# Patient Record
Sex: FEMALE | Race: WHITE | NOT HISPANIC OR LATINO | Employment: FULL TIME | URBAN - NONMETROPOLITAN AREA
[De-identification: names, ages, dates, MRNs, and addresses within clinical notes are randomized per-mention and may not be internally consistent; named-entity substitution may affect disease eponyms.]

---

## 2021-08-28 ENCOUNTER — HOSPITAL ENCOUNTER (EMERGENCY)
Facility: HOSPITAL | Age: 26
Discharge: HOME | End: 2021-08-28
Attending: EMERGENCY MEDICINE | Admitting: EMERGENCY MEDICINE

## 2021-08-28 ENCOUNTER — APPOINTMENT (OUTPATIENT)
Dept: ULTRASOUND IMAGING | Facility: HOSPITAL | Age: 26
End: 2021-08-28

## 2021-08-28 VITALS
HEART RATE: 88 BPM | WEIGHT: 118 LBS | BODY MASS INDEX: 20.14 KG/M2 | SYSTOLIC BLOOD PRESSURE: 104 MMHG | RESPIRATION RATE: 18 BRPM | OXYGEN SATURATION: 99 % | HEIGHT: 64 IN | DIASTOLIC BLOOD PRESSURE: 72 MMHG | TEMPERATURE: 98.1 F

## 2021-08-28 DIAGNOSIS — Z97.5 IUD (INTRAUTERINE DEVICE) IN PLACE: Primary | ICD-10-CM

## 2021-08-28 PROCEDURE — 99282 EMERGENCY DEPT VISIT SF MDM: CPT

## 2021-08-28 PROCEDURE — 76830 TRANSVAGINAL US NON-OB: CPT

## 2021-09-06 ENCOUNTER — TRANSCRIBE ORDERS (OUTPATIENT)
Dept: LAB | Facility: HOSPITAL | Age: 26
End: 2021-09-06

## 2021-09-06 ENCOUNTER — LAB (OUTPATIENT)
Dept: LAB | Facility: HOSPITAL | Age: 26
End: 2021-09-06

## 2021-09-06 DIAGNOSIS — Z20.822 COVID-19 RULED OUT: ICD-10-CM

## 2021-09-06 DIAGNOSIS — Z20.822 COVID-19 RULED OUT: Primary | ICD-10-CM

## 2021-09-06 PROCEDURE — U0004 COV-19 TEST NON-CDC HGH THRU: HCPCS

## 2021-09-06 PROCEDURE — C9803 HOPD COVID-19 SPEC COLLECT: HCPCS

## 2021-09-07 LAB — SARS-COV-2 RNA NOSE QL NAA+PROBE: DETECTED

## 2021-09-08 ENCOUNTER — TELEPHONE (OUTPATIENT)
Dept: OTHER | Facility: HOSPITAL | Age: 26
End: 2021-09-08

## 2021-09-08 NOTE — TELEPHONE ENCOUNTER
Patient informed of positive COVID results.  Acknowledged receipt of [facility] instruction form. All questions answered.

## 2022-06-22 PROCEDURE — U0004 COV-19 TEST NON-CDC HGH THRU: HCPCS | Performed by: NURSE PRACTITIONER

## 2022-07-27 PROCEDURE — U0004 COV-19 TEST NON-CDC HGH THRU: HCPCS | Performed by: FAMILY MEDICINE

## 2022-09-26 ENCOUNTER — TRANSCRIBE ORDERS (OUTPATIENT)
Dept: ADMINISTRATIVE | Facility: HOSPITAL | Age: 27
End: 2022-09-26

## 2022-09-26 DIAGNOSIS — Z02.1 NEED FOR HISTORY AND PHYSICAL EXAMINATION FOR EMPLOYMENT: Primary | ICD-10-CM

## 2022-09-29 ENCOUNTER — HOSPITAL ENCOUNTER (OUTPATIENT)
Dept: GENERAL RADIOLOGY | Facility: HOSPITAL | Age: 27
Discharge: HOME | End: 2022-09-29

## 2022-09-29 DIAGNOSIS — Z02.1 NEED FOR HISTORY AND PHYSICAL EXAMINATION FOR EMPLOYMENT: ICD-10-CM

## 2022-09-29 PROCEDURE — 71046 X-RAY EXAM CHEST 2 VIEWS: CPT

## 2023-01-09 NOTE — PROGRESS NOTES
"Chief Complaint  Depression, anxiety, nicotine use, and marijuana use    Subjective          Aleyda Brown presents to [facility] by herself for an initial evaluation. The patient is a self-referral.    History of Present Illness: Aleyda states, \"I am here for my mental health. I have an IUD and have always been on birth control since 17 because my periods are so bad.\" Aleyda tells me she has struggled with anxiety and depression for some time. She states, \"I lost my mind a couple of weeks ago.\" She reports having a period of severe depression when she was sleeping all day and feeling extreme fatigue and feelings of worthlessness. She feels her brain is in a fog and she cannot concentrate during depression. She reports her mood turning to anger after the depressed states. She tells me there are no aggravating factors or reasons for the anger which leads to further guilt feelings. She tells me her anger is directed towards herself and she has thoughts such as \"I don't want to do life.\" She sleeps 11 hours when she is down and only about 6 hours when she is not. She uses cannabis to help her sleep, anxiety, and mood. She smokes the \"dab form and blunts.\" She tells me she does not smoke everyday because she has a physically active job and likes to go to the gym about 3 times a week. She reports a varying appetite and often has to force herself to eat when she is depressed. She goes for days without eating when she is depressed. She tells me her anxiety is constant and she has situational anxiety which causes her heart to pound and her to feel shaky and sweaty.  She is not taking psychiatric medications at this time. She denies any HI/AVH.    Past Psychiatric History: Aleyda had therapy as a child after her parents' divorce. She denies any medications. She denies any inpatient psychiatric hospitalizations or residential treatments. She denies any suicide attempts. She " "denies any self-harming behaviors.    Substance Use/Abuse: Aleyda began using THC at 18. She tells me her use was younger in her teens and early adulthood before she began working. She would smoke about 2 GMs daily. She finds her use decreasing over time but will smoke more when she is depressed. She feels she does rely on it to manage her mood and anxiety and it has caused problems with her relationship with her mother. She denies needing cannabis to find enjoyment in activities and denies risky behaviors. She smokes about 1 GM every 2 days now. She has cravings when she is anxious or depressed but denies any now. She denies any legal problems related to cannabis use. She started using alcohol at age 17. She had increased use into her early 20s and when she was out with friends. She felt it worsened her depression so her use is rare now. She last drank beer about 1.5 weeks ago. She denies any cravings or legal problems related to the use. She reports starting nicotine use ar 17. She started smoking cigarettes and then turned to e-cigarettes when she wanted to stop. She is smoking cigarettes again and is a 1/2 ppd smoker. She rates her cravings a 8 out of 10 with 10 being the highest. She would like to quit. She has tried psychedelic mushrooms and Adderall once. She denies any complications of the use or legal problems or cravings.      Family Psychiatric History: Aleyda tells me her biological mother has depression. She reports her biological father as having addiction issues.     Developmental History: Aleyda was raised by her biological parents until about age 10. They  and  when she was 11. She tells me her father was using drugs and her relationship with him was \"off and on.\" She was an older biological brother who is 29. Her mother remarried when she was 24 but they had been together for 13 years before this. She tells me the relationship with her mother, step-dad, and brother is \"great.\" " "She tells me she was a \"heathen\" is school and had one year of alternative school her freshman year. 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 She was able to make and keep friends. She tells me she identifies as carlos and had one long-term relationship with a female from [age]-years-old. They lived together before splitting up. She has dated since then but tells me she has a difficult time getting close to others for fear of abandonment. She is working at [employer].     Social History: Aleyda currently lives in [place] by herself. She has two dachshunds, Farhana and Mc. She is working full-time at [employer] and is interviewing for a promotion in March. She enjoys taking her dogs to the park, going to the gym, cleaning, and visiting her grandparents. She reports the daily use of nicotine, the weekly use of cannabis, and the rare use of alcohol.     Objective   Vital Signs:   /64   Pulse 73   Ht 162.6 cm (64\")   Wt 54 kg (119 lb)   BMI 20.43 kg/m²       PHQ-9 Score:   PHQ-9 Total Score: 17     Mental Status Exam:   Hygiene:   good  Cooperation:  Cooperative  Eye Contact:  Good  Psychomotor Behavior:  Appropriate  Affect:  tearful at times  Mood: depressed  Speech:  Normal  Thought Process:  Goal directed and Linear  Thought Content:  Normal  Suicidal:  None  Homicidal:  None  Hallucinations:  None  Delusion:  None  Memory:  Intact  Orientation:  Person, Place, Time and Situation  Reliability:  good  Insight:  Good  Judgement:  Good  Impulse Control:  Good  Physical/Medical Issues:  No      Current Medications:   Current Outpatient Medications   Medication Sig Dispense Refill   • levonorgestrel (Mirena, 52 MG,) 20 MCG/24HR IUD 1 each by Intrauterine route 1 (One) Time.     • multivitamin with minerals tablet tablet Take 1 tablet by mouth Daily.     • FLUoxetine (PROzac) 10 MG capsule Take 1 capsule by mouth Daily for 14 days, THEN 2 " capsules Daily for 14 days. 42 capsule 0   • propranolol (INDERAL) 10 MG tablet Take 1 tablet by mouth 2 (Two) Times a Day As Needed (anxiety). 60 tablet 2     No current facility-administered medications for this visit.   Physical Exam  Vitals and nursing note reviewed.   Constitutional:       Appearance: Normal appearance. She is well-developed.   Musculoskeletal:         General: Normal range of motion.   Skin:     General: Skin is warm and dry.   Neurological:      Mental Status: She is alert and oriented to person, place, and time.   Psychiatric:         Attention and Perception: Attention normal.         Mood and Affect: Mood is depressed. Affect is tearful.         Speech: Speech normal.         Behavior: Behavior normal. Behavior is cooperative.         Thought Content: Thought content includes suicidal (passive without intent or plan) ideation.         Cognition and Memory: Cognition normal.         Judgment: Judgment normal.        Result Review :                 Assessment and Plan    Diagnoses and all orders for this visit:    1. Moderate major depression (HCC) (Primary)  -     FLUoxetine (PROzac) 10 MG capsule; Take 1 capsule by mouth Daily for 14 days, THEN 2 capsules Daily for 14 days.  Dispense: 42 capsule; Refill: 0    2. ROWENA (generalized anxiety disorder)  -     FLUoxetine (PROzac) 10 MG capsule; Take 1 capsule by mouth Daily for 14 days, THEN 2 capsules Daily for 14 days.  Dispense: 42 capsule; Refill: 0  -     propranolol (INDERAL) 10 MG tablet; Take 1 tablet by mouth 2 (Two) Times a Day As Needed (anxiety).  Dispense: 60 tablet; Refill: 2    3. Nicotine dependence with current use    4. Marijuana use, continuous         Impression:  -This is an initial evaluation of the patient. Aleyda is a 27-year-old female who presents by herself for a medication evaluation. She has been experiencing depression and anxiety for some time. She has been managing her mood and anxiety with cannabis use but would  like to try and stop. We discussed the risks of use, especially in the dab form, due to high THC concentrations which can lead to psychosis. She feels she can stop dabbing and may need time to stop the plant form. We discussed implementing small changes to decrease use over time. She expresses her desire to stop smoking so we discussed managing depression and nicotine carvings with Wellbutrin SR. She has concerns since this can raise dopamine and possibly worsen psychosis when combined with THC. So, we discussed managing her symptoms with Prozac and Propranolol. I explained the mechanism of action and purpose of both medications. We discussed risks and benefits, as well as adverse effects. She feels this may be a positive choice. Lastly, we discussed implementing therapy. I provided information about therapy within this clinic and gave her the resource [facility].  -Aleyda Brown  reports that she has been smoking cigarettes. She has been smoking an average of .5 packs per day. She does not have any smokeless tobacco history on file.. I have educated her on the risk of diseases from using tobacco products such as cancer, COPD and heart disease.     I advised her to quit and she is willing to quit. We have discussed the following method/s for tobacco cessation:  Counseling Prescription Medicaiton.  Together we have set a quit date for unknown.  She will follow up with me in 4 weeks or sooner to check on her progress.    I spent 3  minutes counseling the patient.       -Initiate Prozac 10 mg daily for two weeks and then 20 mg for depression and anxiety.  -Initiate Propranolol 10 mg two times daily as needed for anxiety.  -Consider therapy.    -The ANTOIN[facility]TE report, request number [id number] , of the past 12 months were reviewed and is appropriate.  The patient/guardian reports taking the medication only as prescribed.  The patient/guardian denies any abuse or misuse of the medication.  The patient/guardian  denies any other substance use or issues.  There are no apparent substance related issues.  The patient reports no side effects of the current medication usage.  The patient/guardian has reported significant improvement with medication usage and wishes to continue medication as prescribed.  The patient/guardian is appropriate to continue with current medication usage at this time.  Reinforced risks and side effects of medication usage, patient and/or guardian verbalize understanding in their own words and are in agreement with current plan.    TREATMENT PLAN/GOALS: Continue supportive psychotherapy efforts and medications as indicated. Treatment and medication options discussed during today's visit. Patient ackowledged and verbally consented to continue with current treatment plan and was educated on the importance of compliance with treatment and follow-up appointments.    MEDICATION ISSUES:    We discussed risks, benefits, and side effects of the above medications and the patient was agreeable with the plan. Patient was educated on the importance of compliance with treatment and follow-up appointments.  Patient is agreeable to call the office with any worsening of symptoms or onset of side effects. Patient is agreeable to call 911 or go to the nearest ER should he/she begin having SI/HI.      Counseled patient regarding multimodal approach with healthy nutrition, healthy sleep, regular physical activity, social activities, counseling, and medications.      Coping skills reviewed and encouraged positive framing of thoughts     Assisted patient in processing above session content; acknowledged and normalized patient's thoughts, feelings, and concerns.  Applied  positive coping skills and behavior management in session.  Allowed patient to freely discuss issues without interruption or judgment. Provided safe, confidential environment to facilitate the development of positive therapeutic relationship and encourage  open, honest communication. Assisted patient in identifying risk factors which would indicate the need for higher level of care including thoughts to harm self or others and/or self-harming behavior and encouraged patient to contact this office, call 911, or present to the nearest emergency room should any of these events occur. Discussed crisis intervention services and means to access.     MEDS ORDERED DURING VISIT:  New Medications Ordered This Visit   Medications   • FLUoxetine (PROzac) 10 MG capsule     Sig: Take 1 capsule by mouth Daily for 14 days, THEN 2 capsules Daily for 14 days.     Dispense:  42 capsule     Refill:  0   • propranolol (INDERAL) 10 MG tablet     Sig: Take 1 tablet by mouth 2 (Two) Times a Day As Needed (anxiety).     Dispense:  60 tablet     Refill:  2           Follow Up   Return in about 4 weeks (around 2/8/2023) for Medication Check.    Patient was given instructions and counseling regarding her condition or for health maintenance advice. Please see specific information pulled into the AVS if appropriate.     This document has been electronically signed by SONIDO Wooten  January 12, 2023 09:38 EST      This document has been electronically signed by SONIDO Wayne, PMHNP-BC  January 12, 2023 09:38 EST    Part of this note may be an electronic transcription/translation of spoken language to printed text using the Dragon Dictation System.

## 2023-01-11 ENCOUNTER — OFFICE VISIT (OUTPATIENT)
Dept: PSYCHIATRY | Facility: CLINIC | Age: 28
End: 2023-01-11
Payer: COMMERCIAL

## 2023-01-11 VITALS
SYSTOLIC BLOOD PRESSURE: 102 MMHG | WEIGHT: 119 LBS | HEIGHT: 64 IN | BODY MASS INDEX: 20.32 KG/M2 | DIASTOLIC BLOOD PRESSURE: 64 MMHG | HEART RATE: 73 BPM

## 2023-01-11 DIAGNOSIS — F41.1 GAD (GENERALIZED ANXIETY DISORDER): ICD-10-CM

## 2023-01-11 DIAGNOSIS — F17.200 NICOTINE DEPENDENCE WITH CURRENT USE: ICD-10-CM

## 2023-01-11 DIAGNOSIS — F32.1 MODERATE MAJOR DEPRESSION: Primary | ICD-10-CM

## 2023-01-11 DIAGNOSIS — F12.90 MARIJUANA USE, CONTINUOUS: ICD-10-CM

## 2023-01-11 PROCEDURE — 90792 PSYCH DIAG EVAL W/MED SRVCS: CPT | Performed by: NURSE PRACTITIONER

## 2023-01-11 RX ORDER — MULTIPLE VITAMINS W/ MINERALS TAB 9MG-400MCG
1 TAB ORAL DAILY
COMMUNITY

## 2023-01-11 RX ORDER — FLUOXETINE 10 MG/1
CAPSULE ORAL
Qty: 42 CAPSULE | Refills: 0 | Status: SHIPPED | OUTPATIENT
Start: 2023-01-11 | End: 2023-02-09 | Stop reason: SDUPTHER

## 2023-01-11 RX ORDER — PROPRANOLOL HYDROCHLORIDE 10 MG/1
10 TABLET ORAL 2 TIMES DAILY PRN
Qty: 60 TABLET | Refills: 2 | Status: SHIPPED | OUTPATIENT
Start: 2023-01-11

## 2023-02-09 DIAGNOSIS — F32.1 MODERATE MAJOR DEPRESSION: ICD-10-CM

## 2023-02-09 DIAGNOSIS — F41.1 GAD (GENERALIZED ANXIETY DISORDER): ICD-10-CM

## 2023-02-09 RX ORDER — FLUOXETINE HYDROCHLORIDE 20 MG/1
20 CAPSULE ORAL DAILY
Qty: 30 CAPSULE | Refills: 2 | Status: SHIPPED | OUTPATIENT
Start: 2023-02-09

## 2023-02-09 NOTE — TELEPHONE ENCOUNTER
Rx Refill Note  Requested Prescriptions     Pending Prescriptions Disp Refills   • FLUoxetine (PROzac) 10 MG capsule 42 capsule 0     Sig: Take 1 capsule by mouth Daily for 14 days, THEN 2 capsules Daily for 14 days.      Last office visit with prescribing clinician: 1/11/2023      Next office visit with prescribing clinician: 2/14/2023            Liane Castellanos MA  02/09/23, 10:46 EST

## 2023-02-10 DIAGNOSIS — F32.1 MODERATE MAJOR DEPRESSION: ICD-10-CM

## 2023-02-10 DIAGNOSIS — F41.1 GAD (GENERALIZED ANXIETY DISORDER): ICD-10-CM

## 2023-02-10 RX ORDER — FLUOXETINE HYDROCHLORIDE 20 MG/1
20 CAPSULE ORAL DAILY
Qty: 30 CAPSULE | Refills: 2 | OUTPATIENT
Start: 2023-02-10

## 2023-02-10 NOTE — PROGRESS NOTES
"This provider is located at [facility] ,[address], [address] in [place], [place],using a secure VocalZoomhart Video Visit through [employer]. Patient is being seen remotely via telehealth at their home address in [place], and stated they are in a secure environment for this session. The patient's condition being diagnosed/treated is appropriate for telemedicine. The provider identified herself as well as her credentials.   The patient, and/or patients guardian, consent to be seen remotely, and when consent is given they understand that the consent allows for patient identifiable information to be sent to a third party as needed.   They may refuse to be seen remotely at any time. The electronic data is encrypted and password protected, and the patient and/or guardian has been advised of the potential risks to privacy not withstanding such measures.    Chief Complaint  Depression, anxiety, nicotine use, and marijuana use    Subjective          Aleyda Brown presents today via MyChart Video through [employer] by herself for a follow up and medication check.    History of Present Illness: Aleyda states, \"I am having more good days than bad days.\" Aleyda tells me the addition of Prozac has been helpful for both depressive symptoms and anxiety. She used Propranolol about three times and would like to continue PRN. She is sleeping and eating well. She continues to work and denies any concerns.  She is taking Prozac and Propranolol. She denies any side effects. She denies any SI/HI/AVH.    Current Medications:   Current Outpatient Medications   Medication Sig Dispense Refill   • FLUoxetine (PROzac) 20 MG capsule Take 1 capsule by mouth Daily. 30 capsule 2   • levonorgestrel (Mirena, 52 MG,) 20 MCG/24HR IUD 1 each by Intrauterine route 1 (One) Time.     • multivitamin with minerals tablet tablet Take 1 tablet by mouth Daily.     • propranolol (INDERAL) 10 MG tablet Take 1 tablet by " mouth 2 (Two) Times a Day As Needed (anxiety). 60 tablet 2     No current facility-administered medications for this visit.       Objective   Vital Signs:   There were no vitals taken for this visit.    Physical Exam  Nursing note reviewed. Vitals reviewed: Vitals not obtained due to nature of telehealth visit.   Constitutional:       Appearance: Normal appearance.   Neurological:      General: No focal deficit present.      Mental Status: She is alert and oriented to person, place, and time.   Psychiatric:         Attention and Perception: Attention normal.         Mood and Affect: Mood normal.         Speech: Speech normal.         Behavior: Behavior normal. Behavior is cooperative.         Thought Content: Thought content normal.         Cognition and Memory: Cognition normal.         Judgment: Judgment normal.        Result Review :                   Assessment and Plan    Problem List Items Addressed This Visit    None  Visit Diagnoses     Moderate major depression (HCC)    -  Primary    ROWENA (generalized anxiety disorder)        Nicotine dependence with current use  (Chronic)       Marijuana use, continuous  (Chronic)             Mental Status Exam:   Hygiene:   good  Cooperation:  Cooperative  Eye Contact:  Good  Psychomotor Behavior:  Appropriate  Affect:  Appropriate  Mood: normal  Speech:  Normal  Thought Process:  Goal directed and Linear  Thought Content:  Normal  Suicidal:  None  Homicidal:  None  Hallucinations:  None  Delusion:  None  Memory:  Intact  Orientation:  Person, Place, Time and Situation  Reliability:  good  Insight:  Good  Judgement:  Good  Impulse Control:  Good  Physical/Medical Issues:  No      PHQ-9 Score:   PHQ-9 Total Score:       ROWENA-7 Score:       Impression/Plan:  -This is a follow up and medication check. Aleyda reports improved mood and anxiety. She feels Prozac is helping her have more good days overall. She'd like to stay at the 20 mg dose for an additional month and continue  using Propranolol only as needed. She continues to work. She is considering therapy and would like to explore options in our clinic. She is sleeping and eating well. She will call at the susanna of her current prescription if she'd like to consider an increase in dose.   -Continue Prozac 20 mg for depression and anxiety. Patient has refills.   -Continue Propranolol 10 mg two times daily as needed for anxiety. Patient has refills.   -Consider therapy.    MEDS ORDERED DURING VISIT:  No orders of the defined types were placed in this encounter.      I spent 20 minutes caring for Aleyda on this date of service. This time includes time spent by me in the following activities:preparing for the visit, performing a medically appropriate examination and/or evaluation , counseling and educating the patient/family/caregiver, ordering medications, tests, or procedures, documenting information in the medical record and care coordination  Follow Up   Return in about 2 months (around 4/14/2023) for Medication Check.  Patient was given instructions and counseling regarding her condition or for health maintenance advice. Please see specific information pulled into the AVS if appropriate.       TREATMENT PLAN/GOALS: Continue supportive psychotherapy efforts and medications as indicated. Treatment and medication options discussed during today's visit. Patient acknowledged and verbally consented to continue with current treatment plan and was educated on the importance of compliance with treatment and follow-up appointments.    MEDICATION ISSUES:  Discussed medication options and treatment plan of prescribed medication as well as the risks, benefits, and side effects including potential falls, possible impaired driving and metabolic adversities among others. Patient is agreeable to call the office with any worsening of symptoms or onset of side effects. Patient is agreeable to call 911, call [phone], or go to the nearest ER should he/she  begin having SI/HI.      This document has been electronically signed by SONIDO Wayne, PMHNP-BC  February 14, 2023 10:03 EST    Part of this note may be an electronic transcription/translation of spoken language to printed text using the Dragon Dictation System.

## 2023-02-11 DIAGNOSIS — F32.1 MODERATE MAJOR DEPRESSION: ICD-10-CM

## 2023-02-11 DIAGNOSIS — F41.1 GAD (GENERALIZED ANXIETY DISORDER): ICD-10-CM

## 2023-02-13 RX ORDER — FLUOXETINE HYDROCHLORIDE 20 MG/1
20 CAPSULE ORAL DAILY
Qty: 30 CAPSULE | Refills: 2 | OUTPATIENT
Start: 2023-02-13

## 2023-02-13 RX ORDER — FLUOXETINE 10 MG/1
CAPSULE ORAL
Qty: 42 CAPSULE | Refills: 0 | OUTPATIENT
Start: 2023-02-13

## 2023-02-14 ENCOUNTER — TELEMEDICINE (OUTPATIENT)
Dept: PSYCHIATRY | Facility: CLINIC | Age: 28
End: 2023-02-14
Payer: COMMERCIAL

## 2023-02-14 DIAGNOSIS — F17.200 NICOTINE DEPENDENCE WITH CURRENT USE: Chronic | ICD-10-CM

## 2023-02-14 DIAGNOSIS — F41.1 GAD (GENERALIZED ANXIETY DISORDER): ICD-10-CM

## 2023-02-14 DIAGNOSIS — F32.1 MODERATE MAJOR DEPRESSION: Primary | ICD-10-CM

## 2023-02-14 DIAGNOSIS — F12.90 MARIJUANA USE, CONTINUOUS: Chronic | ICD-10-CM

## 2023-02-14 PROCEDURE — 99213 OFFICE O/P EST LOW 20 MIN: CPT | Performed by: NURSE PRACTITIONER

## 2023-04-15 NOTE — PROGRESS NOTES
"This provider is located at [facility] ,[address], [address] in [place], [place],using a secure MyChart Video Visit through [employer]. Patient is being seen remotely via telehealth at their home address in [place], and stated they are in a secure environment for this session. The patient's condition being diagnosed/treated is appropriate for telemedicine. The provider identified herself as well as her credentials.   The patient, and/or patients guardian, consent to be seen remotely, and when consent is given they understand that the consent allows for patient identifiable information to be sent to a third party as needed.   They may refuse to be seen remotely at any time. The electronic data is encrypted and password protected, and the patient and/or guardian has been advised of the potential risks to privacy not withstanding such measures.    Chief Complaint  Depression, anxiety, nicotine use, and marijuana use    Subjective          Aleyda Brown presents today via MyChart Video through [employer] by herself for a follow up and medication check.    History of Present Illness: Aleyda states, \"I am doing pretty good.\"  Aleyda tells me that she has been having a difficult time with depression and anxiety.  She tells me this is affecting her work.  About 2-1/2 weeks ago she was feeling very down but also overly anxious.  She presented to  and discussed her concerns.  They felt FMLA may be an option for her while she adjust her medications.  After a depressive period, she will have increased energy, more motivation to engage in activities, racing thoughts, decreased need for sleep, and elevated mood.  She has been discussing these symptoms with her mother who feels they could be related to her previous ADHD diagnosis.  She was treated with Concerta and another stimulant as a child and notes that they both worsened her mood.  She has also considered that her depression " may be related to her birth control.  She reports compliance with her psychiatric medications.  She is taking Prozac and Propranolol (rarely. She denies any side effects. She denies any SI/HI/AVH.    Current Medications:   Current Outpatient Medications   Medication Sig Dispense Refill   • FLUoxetine (PROzac) 20 MG capsule Take 1 capsule by mouth Daily. 30 capsule 2   • levonorgestrel (Mirena, 52 MG,) 20 MCG/24HR IUD 1 each by Intrauterine route 1 (One) Time.     • multivitamin with minerals tablet tablet Take 1 tablet by mouth Daily.     • lamoTRIgine (LaMICtal) 25 MG tablet Take 1 tablet by mouth Every Night for 14 days, THEN 2 tablets Every Night for 14 days. 42 tablet 0   • propranolol (INDERAL) 10 MG tablet Take 1 tablet by mouth 2 (Two) Times a Day As Needed (anxiety). (Patient not taking: Reported on 4/17/2023) 60 tablet 2     No current facility-administered medications for this visit.       Objective   Vital Signs:   There were no vitals taken for this visit.    Physical Exam  Nursing note reviewed. Vitals reviewed: Vitals not obtained due to nature of telehealth visit.   Constitutional:       Appearance: Normal appearance. She is normal weight.   Neurological:      General: No focal deficit present.      Mental Status: She is alert and oriented to person, place, and time.   Psychiatric:         Attention and Perception: Attention normal.         Mood and Affect: Mood is anxious.         Speech: Speech normal.         Behavior: Behavior normal. Hyperactive: restless. Behavior is cooperative.         Thought Content: Thought content normal.         Cognition and Memory: Cognition normal.         Judgment: Judgment normal.        The following data was reviewed by: SONIDO Wooten on 04/17/2023:  POC Urinalysis Dipstick, Multipro (Automated Dipstick) (03/27/2023 13:22)  Urine Culture - Urine, Urine, Clean Catch (03/27/2023 13:31)    UC with Annabella Wesley APRN (03/27/2023)  Assessment and  Plan    Problem List Items Addressed This Visit    None  Visit Diagnoses     Moderate major depression  (Chronic)   -  Primary    Exploring a possible bipolar disorder    Relevant Medications    lamoTRIgine (LaMICtal) 25 MG tablet    ROWENA (generalized anxiety disorder)  (Chronic)       Relevant Medications    lamoTRIgine (LaMICtal) 25 MG tablet    Nicotine dependence with current use  (Chronic)       Marijuana use, continuous              Mental Status Exam:   Hygiene:   good  Cooperation:  Cooperative  Eye Contact:  Good  Psychomotor Behavior:  Restless  Affect:  Appropriate  Mood: anxious  Speech:  Normal  Thought Process:  Goal directed and Linear  Thought Content:  Normal  Suicidal:  None  Homicidal:  None  Hallucinations:  None  Delusion:  None  Memory:  Intact  Orientation:  Person, Place, Time and Situation  Reliability:  good  Insight:  Good  Judgement:  Good  Impulse Control:  Good  Physical/Medical Issues:  No      PHQ-9 Score:   PHQ-9 Total Score:       ROWENA-7 Score:       Impression/Plan:  -This is a follow up and medication check. Aleyda reports having ongoing mood lability.  For about a week, she was experiencing depression and anxiety.  These symptoms were interfering with her ability to work.  She used some vacation days and also had to take an occurrence.  Her  discussed possibly using FMLA while she adjust her medications.  After the depressive states, she feels she has more motivation, increased energy, racing thoughts, intense interest, and decreased need for sleep.  Her mother attributes these symptoms to ADHD; however, I provided education about a possible bipolar disorder.  She appears to have fluctuations in mood with a time frame that coincides with a hypomanic episode so bipolar 2 disorder.  I encouraged her to complete a mood tracker, as well as a rapid mood screener.  I provided an email for her to return these items.  She and I discussed possibly using lamotrigine for  mood stabilization.  I informed her that increasing Prozac could further activate a possible bipolar disorder.  I explained the mechanism of action and purpose of the medication, as well as risk versus benefits and adversities.  She agrees to try.  He is aware that if she would like to pursue FMLA, I will help complete the paperwork but it may take up to a week.  -Initiate Lamotrigine 25 mg nightly for two weeks and then 50 mg nightly for depression and anxiety. I explained the purpose of this medication to the patient We discussed the risks versus benefits of adding this to the regiment, as well as potential side effects including rash. Verbalizes understanding.   -Continue Prozac 20 mg for depression and anxiety. Patient has refills.   -Continue Propranolol 10 mg two times daily as needed for anxiety. Patient has refills.   -Consider therapy.  -Complete mood tracker and rapid mood screener    MEDS ORDERED DURING VISIT:  New Medications Ordered This Visit   Medications   • lamoTRIgine (LaMICtal) 25 MG tablet     Sig: Take 1 tablet by mouth Every Night for 14 days, THEN 2 tablets Every Night for 14 days.     Dispense:  42 tablet     Refill:  0     Follow Up   Return in about 4 weeks (around 5/15/2023) for Medication Check.  Patient was given instructions and counseling regarding her condition or for health maintenance advice. Please see specific information pulled into the AVS if appropriate.       TREATMENT PLAN/GOALS: Continue supportive psychotherapy efforts and medications as indicated. Treatment and medication options discussed during today's visit. Patient acknowledged and verbally consented to continue with current treatment plan and was educated on the importance of compliance with treatment and follow-up appointments.    MEDICATION ISSUES:  Discussed medication options and treatment plan of prescribed medication as well as the risks, benefits, and side effects including potential falls, possible impaired  driving and metabolic adversities among others. Patient is agreeable to call the office with any worsening of symptoms or onset of side effects. Patient is agreeable to call 911, call [phone], or go to the nearest ER should he/she begin having SI/HI.      This document has been electronically signed by SONIDO Wayne, PMHNP-BC  April 17, 2023 12:10 EDT    Part of this note may be an electronic transcription/translation of spoken language to printed text using the Dragon Dictation System.

## 2023-04-17 ENCOUNTER — TELEMEDICINE (OUTPATIENT)
Dept: PSYCHIATRY | Facility: CLINIC | Age: 28
End: 2023-04-17
Payer: COMMERCIAL

## 2023-04-17 DIAGNOSIS — F41.1 GAD (GENERALIZED ANXIETY DISORDER): Chronic | ICD-10-CM

## 2023-04-17 DIAGNOSIS — F12.90 MARIJUANA USE, CONTINUOUS: ICD-10-CM

## 2023-04-17 DIAGNOSIS — F32.1 MODERATE MAJOR DEPRESSION: Primary | Chronic | ICD-10-CM

## 2023-04-17 DIAGNOSIS — F17.200 NICOTINE DEPENDENCE WITH CURRENT USE: Chronic | ICD-10-CM

## 2023-04-17 PROCEDURE — 99214 OFFICE O/P EST MOD 30 MIN: CPT | Performed by: NURSE PRACTITIONER

## 2023-04-17 RX ORDER — LAMOTRIGINE 25 MG/1
TABLET ORAL
Qty: 42 TABLET | Refills: 0 | Status: SHIPPED | OUTPATIENT
Start: 2023-04-17 | End: 2023-05-15

## 2023-04-26 ENCOUNTER — TELEPHONE (OUTPATIENT)
Dept: PSYCHIATRY | Facility: CLINIC | Age: 28
End: 2023-04-26
Payer: COMMERCIAL

## 2023-04-26 NOTE — TELEPHONE ENCOUNTER
No. I would not anticipate this level of sedation for 25 mg. Any way she could be coming down with something? I would hold it for tonight and tomorrow night and call back Friday with an update.

## 2023-04-26 NOTE — TELEPHONE ENCOUNTER
Patient called in with concerns. She started Lamictal 25 mg on 04/19/2023. She takes it after work around 9:00 am before going to bed. Yesterday she slept 16 hours slept thru 5 alarms and missed work last night. She said she woke up at 6 pm to let her dog out and then slept until 10:40 pm. She said she was not having a episode or is depressed, just very drowsy with the Lamictal. She said it was like a drunk feeling.She is concerned if she should continue and if drowsiness will continue or get better. She has work 3rd shift for sometime and even use to work 70 hours a week and never over slept for work. I did give her a work excuse for yesterday. Please advise if she should continue and if so if this will get better.

## 2023-06-07 DIAGNOSIS — F41.1 GAD (GENERALIZED ANXIETY DISORDER): ICD-10-CM

## 2023-06-07 DIAGNOSIS — F32.1 MODERATE MAJOR DEPRESSION: ICD-10-CM

## 2023-06-07 RX ORDER — FLUOXETINE HYDROCHLORIDE 20 MG/1
20 CAPSULE ORAL DAILY
Qty: 30 CAPSULE | Refills: 2 | Status: SHIPPED | OUTPATIENT
Start: 2023-06-07

## 2023-08-22 ENCOUNTER — OFFICE VISIT (OUTPATIENT)
Dept: PSYCHIATRY | Facility: CLINIC | Age: 28
End: 2023-08-22
Payer: COMMERCIAL

## 2023-08-22 DIAGNOSIS — F41.1 GAD (GENERALIZED ANXIETY DISORDER): ICD-10-CM

## 2023-08-22 DIAGNOSIS — F32.1 MODERATE MAJOR DEPRESSION: Primary | ICD-10-CM

## 2023-08-22 DIAGNOSIS — F17.200 NICOTINE DEPENDENCE WITH CURRENT USE: ICD-10-CM

## 2023-08-22 DIAGNOSIS — F12.90 MARIJUANA USE, CONTINUOUS: ICD-10-CM

## 2023-08-22 NOTE — PROGRESS NOTES
Follow Up Adult Note       Date Encounter: 2023   Name: Aleyda Brown  MRN: [id number]  : 1995    Time In: 8:00 am  Time Out: 8:53 am     Referring Provider: Provider, No Known    Chief Complaint:      ICD-10-CM ICD-9-CM   1. Moderate major depression  F32.1 296.22   2. ROWENA (generalized anxiety disorder)  F41.1 300.02   3. Nicotine dependence with current use  F17.200 305.1   4. Marijuana use, continuous  F12.90 305.21        History of Present Illness:   Aleyda Brown is a 27 y.o. female who is being seen today for follow up counseling for moderate major depression, generalized anxiety disorder, nicotine dependence with current use, and marijuana use.  Patient was referred by SONIDO Odom.  Patient reports a history of strained interpersonal relationships.  Patient reports medication compliance and feels that her moods have stabilized.  Discussed how thoughts, emotions, and behaviors affect one another and explored the [place] of control.    Subjective        Patient's Support Network Includes:  mother and brother    Functional Status: No impairment    Medications:     Current Outpatient Medications:     FLUoxetine (PROzac) 10 MG capsule, Take 3 capsules by mouth Daily., Disp: 90 capsule, Rfl: 2    levonorgestrel (Mirena, 52 MG,) 20 MCG/24HR IUD, 1 each by Intrauterine route 1 (One) Time., Disp: , Rfl:     multivitamin with minerals tablet tablet, Take 1 tablet by mouth Daily., Disp: , Rfl:     propranolol (INDERAL) 10 MG tablet, Take 1 tablet by mouth 2 (Two) Times a Day As Needed (anxiety)., Disp: 60 tablet, Rfl: 2    Allergies:   Allergies   Allergen Reactions    Sulfa Antibiotics Rash        Objective       Mental Status Exam:   Hygiene:   good  Cooperation:  Cooperative  Eye Contact:  Good  Psychomotor Behavior:  Fidgety  Affect:  Appropriate  Mood: normal and good, happy, relaxed  Hopelessness: Denies  Speech:  Normal  Thought Process:  Goal directed and Linear  Thought  Content:  Normal  Suicidal:  None  Homicidal:  None  Hallucinations:  None  Delusion:  None  Memory:  Intact  Orientation:  Person, Place, Time, and Situation  Reliability:  good  Insight:  Good  Judgement:  Good  Impulse Control:  Good    Assessment / Plan      Visit Diagnosis/Orders Placed This Visit:    ICD-10-CM ICD-9-CM   1. Moderate major depression  F32.1 296.22   2. ROWENA (generalized anxiety disorder)  F41.1 300.02   3. Nicotine dependence with current use  F17.200 305.1   4. Marijuana use, continuous  F12.90 305.21        PLAN:    Progress toward goal: not at goal    Prognosis: Good with ongoing treatment    Safety: Patient denies SI/HI.  This is patient's first session.  Therapist and patient reviewed options for help including call 911, 368 the suicide hotline, and going to the neared ER.  Therapist will continue to monitor.  Risk Assessment: Risk of self-harm acutely is low. Risk of self-harm chronically is also low, but could be further elevated in the event of treatment noncompliance and/or AODA.    Treatment Plan/Goals: Continue supportive psychotherapy efforts and medications as indicated. Treatment and medication options discussed during today's visit. Patient acknowledged and verbally consented to continue with current treatment plan and was educated on the importance of compliance with treatment and follow-up appointments. Patient seems reasonably able to adhere to treatment plan.      Assisted Patient in processing above session content; acknowledged and normalized patient's thoughts, feelings, and concerns.  Rationalized patient thought process regarding her treatment goals.      Allowed Patient to freely discuss issues  without interruption or judgement with unconditional positive regard, active listening skills, and empathy. Therapist provided a safe, confidential environment to facilitate the development of a positive therapeutic relationship and encouraged open, honest communication. Assisted  Patient in identifying risk factors which would indicate the need for higher level of care including thoughts to harm self or others and/or self-harming behavior and encouraged Patient to contact this office, call 911, or present to the nearest emergency room should any of these events occur. Discussed crisis intervention services and means to access. Patient adamantly and convincingly denies current suicidal or homicidal ideation or perceptual disturbance. Assisted Patient in processing session content; acknowledged and normalized Patient's thoughts, feelings, and concerns by utilizing a person-centered approach in efforts to build appropriate rapport and a positive therapeutic relationship with open and honest communication.     Follow Up:   Return in about 2 weeks (around 9/5/2023) for Therapy.       This document has been electronically signed by Kassie Mata LCSW  August 22, 2023 13:05 EDT

## 2023-09-13 DIAGNOSIS — F41.1 GAD (GENERALIZED ANXIETY DISORDER): Chronic | ICD-10-CM

## 2023-09-13 DIAGNOSIS — F32.1 MODERATE MAJOR DEPRESSION: Chronic | ICD-10-CM

## 2023-09-14 RX ORDER — FLUOXETINE 10 MG/1
30 CAPSULE ORAL DAILY
Qty: 90 CAPSULE | Refills: 2 | OUTPATIENT
Start: 2023-09-14

## 2023-09-15 ENCOUNTER — TRANSCRIBE ORDERS (OUTPATIENT)
Dept: GENERAL RADIOLOGY | Facility: HOSPITAL | Age: 28
End: 2023-09-15
Payer: COMMERCIAL

## 2023-09-15 DIAGNOSIS — Z01.811 PRE-OPERATIVE RESPIRATORY EXAMINATION: Primary | ICD-10-CM

## 2023-09-18 ENCOUNTER — HOSPITAL ENCOUNTER (OUTPATIENT)
Dept: GENERAL RADIOLOGY | Facility: HOSPITAL | Age: 28
Discharge: HOME | End: 2023-09-18
Payer: COMMERCIAL

## 2023-09-18 DIAGNOSIS — Z01.811 PRE-OPERATIVE RESPIRATORY EXAMINATION: ICD-10-CM

## 2023-09-18 PROCEDURE — 71046 X-RAY EXAM CHEST 2 VIEWS: CPT

## 2023-10-22 NOTE — PROGRESS NOTES
"Chief Complaint  Depression, anxiety, nicotine use, and marijuana use    Subjective          Aleyda Brown presents today to [facility] by herself for a follow up and medication check.    History of Present Illness: Aleyda states, \"I I am good.\"  Aleyda tells me she missed her most recent appointment due to a death in the family.  It was her aunt and she was very upset after her passing.  She believes this was when her last depressive episode began.  She tells me it was difficult for her to eat and sleep.  She had requested something from her mother to help her sleep.  She is unsure of the medication she was given or dose but did find it to be helpful.  Aleyda is looking forward to interviewing for the recent position that she had been hoping for.  She continues to work third shift and this will also be a third shift position.  She reports feeling aggravated at times but notes that Prozac has helped with this.  She has not had to use propranolol but may consider using before her interview.  Her appetite has gotten back on track and she is sleeping as best she can with working third shift. She reports compliance with her psychiatric medications.  She is taking Prozac and Propranolol. She denies any side effects. She denies any SI/HI/AVH.    Current Medications:   Current Outpatient Medications   Medication Sig Dispense Refill    FLUoxetine (PROzac) 10 MG capsule Take 3 capsules by mouth Daily. 270 capsule 1    levonorgestrel (Mirena, 52 MG,) 20 MCG/24HR IUD 1 each by Intrauterine route 1 (One) Time.      multivitamin with minerals tablet tablet Take 1 tablet by mouth Daily.      propranolol (INDERAL) 10 MG tablet Take 1 tablet by mouth 2 (Two) Times a Day As Needed (anxiety). 60 tablet 2    QUEtiapine (SEROquel) 25 MG tablet Take 1 tablet by mouth Every Night. 30 tablet 2     No current facility-administered medications for this visit.       Objective   Vital " "Signs:   /76   Pulse 78   Ht 162.6 cm (64\")   Wt 50.8 kg (112 lb)   SpO2 98%   BMI 19.22 kg/m²     Physical Exam  Vitals and nursing note reviewed.   Constitutional:       Appearance: Normal appearance. She is normal weight.   Musculoskeletal:         General: Normal range of motion.   Skin:     General: Skin is warm and dry.   Neurological:      General: No focal deficit present.      Mental Status: She is alert and oriented to person, place, and time.   Psychiatric:         Attention and Perception: Attention normal.         Mood and Affect: Mood normal.         Speech: Speech normal.         Behavior: Behavior normal. Behavior is cooperative.         Thought Content: Thought content normal.         Cognition and Memory: Cognition normal.         Judgment: Judgment normal.        The following data was reviewed by: SONIDO Wooten on 10/26/2023:    Assessment and Plan    Problem List Items Addressed This Visit    None  Visit Diagnoses       Moderate major depression  (Chronic)   -  Primary    Relevant Medications    FLUoxetine (PROzac) 10 MG capsule    QUEtiapine (SEROquel) 25 MG tablet    ROWENA (generalized anxiety disorder)  (Chronic)       Relevant Medications    FLUoxetine (PROzac) 10 MG capsule    QUEtiapine (SEROquel) 25 MG tablet    Nicotine dependence with current use  (Chronic)       Marijuana use, continuous                Mental Status Exam:   Hygiene:   good  Cooperation:  Cooperative  Eye Contact:  Good  Psychomotor Behavior:  Appropriate  Affect:  Appropriate  Mood: normal  Speech:  Normal  Thought Process:  Goal directed and Linear  Thought Content:  Normal  Suicidal:  None  Homicidal:  None  Hallucinations:  None  Delusion:  None  Memory:  Intact  Orientation:  Person, Place, Time and Situation  Reliability:  good  Insight:  Good  Judgement:  Good  Impulse Control:  Good  Physical/Medical Issues:  No      PHQ-9 Score:   PHQ-9 Total Score: 3     ROWENA-7 Score:  ROWENA 7 Total " Score: 6    Impression/Plan:  -This is a follow up and medication check. Aleyda reports improved symptoms of depression and anxiety.  Her most recent depressive episode was in September after the death of her aunt.  She began experiencing difficulty sleeping and eating.  Her mother helped with giving her medication for sleep but she is not sure what the medication was or the dose.  She is looking forward to interviewing for a new position at work.  She feels that she is back on track with her appetite and sleep; however, we did discuss possibly having an as needed option in the event that she becomes depressed and cannot sleep again.  I suggested using Seroquel 25 mg nightly.  I explained the mechanism of action, purpose, risk, benefits, and potential adverse effects.  She does understand this can be only as needed and agrees to try.  She reports the doses of Prozac and propranolol are appropriate at this time and does not need an adjustment.  He has plans to continue therapy and would like to try meeting with [facility] 1 or 2 more times before making a decision about continuing.  -Initiate Seroquel 25 mg nightly for depression and insomnia.  -Continue Prozac 30 mg for depression and anxiety.   -Continue Propranolol 10 mg two times daily as needed for anxiety. Patient has refills.   -Consider therapy. Patient had an appointment with [facility] in August.       MEDS ORDERED DURING VISIT:  New Medications Ordered This Visit   Medications    FLUoxetine (PROzac) 10 MG capsule     Sig: Take 3 capsules by mouth Daily.     Dispense:  270 capsule     Refill:  1    QUEtiapine (SEROquel) 25 MG tablet     Sig: Take 1 tablet by mouth Every Night.     Dispense:  30 tablet     Refill:  2     Follow Up   Return in about 3 months (around 1/26/2024) for Medication Check.  Patient was given instructions and counseling regarding her condition or for health maintenance advice. Please see specific information pulled into the AVS if appropriate.        TREATMENT PLAN/GOALS: Continue supportive psychotherapy efforts and medications as indicated. Treatment and medication options discussed during today's visit. Patient acknowledged and verbally consented to continue with current treatment plan and was educated on the importance of compliance with treatment and follow-up appointments.    MEDICATION ISSUES:  Discussed medication options and treatment plan of prescribed medication as well as the risks, benefits, and side effects including potential falls, possible impaired driving and metabolic adversities among others. Patient is agreeable to call the office with any worsening of symptoms or onset of side effects. Patient is agreeable to call 911, call [phone], or go to the nearest ER should he/she begin having SI/HI.      This document has been electronically signed by SONIDO Wayne, PMHNP-BC  October 26, 2023 12:42 EDT    Part of this note may be an electronic transcription/translation of spoken language to printed text using the Dragon Dictation System.

## 2023-10-26 ENCOUNTER — OFFICE VISIT (OUTPATIENT)
Dept: PSYCHIATRY | Facility: CLINIC | Age: 28
End: 2023-10-26
Payer: COMMERCIAL

## 2023-10-26 VITALS
HEART RATE: 78 BPM | BODY MASS INDEX: 19.12 KG/M2 | DIASTOLIC BLOOD PRESSURE: 76 MMHG | WEIGHT: 112 LBS | HEIGHT: 64 IN | SYSTOLIC BLOOD PRESSURE: 118 MMHG | OXYGEN SATURATION: 98 %

## 2023-10-26 DIAGNOSIS — F32.1 MODERATE MAJOR DEPRESSION: Primary | Chronic | ICD-10-CM

## 2023-10-26 DIAGNOSIS — F17.200 NICOTINE DEPENDENCE WITH CURRENT USE: Chronic | ICD-10-CM

## 2023-10-26 DIAGNOSIS — F12.90 MARIJUANA USE, CONTINUOUS: ICD-10-CM

## 2023-10-26 DIAGNOSIS — F41.1 GAD (GENERALIZED ANXIETY DISORDER): Chronic | ICD-10-CM

## 2023-10-26 PROCEDURE — 99214 OFFICE O/P EST MOD 30 MIN: CPT | Performed by: NURSE PRACTITIONER

## 2023-10-26 RX ORDER — QUETIAPINE FUMARATE 25 MG/1
25 TABLET, FILM COATED ORAL NIGHTLY
Qty: 30 TABLET | Refills: 2 | Status: SHIPPED | OUTPATIENT
Start: 2023-10-26

## 2023-10-26 RX ORDER — FLUOXETINE 10 MG/1
30 CAPSULE ORAL DAILY
Qty: 270 CAPSULE | Refills: 1 | Status: SHIPPED | OUTPATIENT
Start: 2023-10-26

## 2023-12-31 NOTE — PROGRESS NOTES
"Chief Complaint  Depression, anxiety, nicotine use, and marijuana use    Subjective          Aleyda Brown presents today to [facility] by herself for a follow up and medication check.    History of Present Illness: Aleyda states, \"I am pretty good.\"  Aleyda tells me that she got the promotion she had applied for.  She notes that there will be a big changes at work for her.  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  She will be interacting with new people, attending meetings, and making decisions.  She reports having an increase in anxiety when thinking about her new position, even though she is also having feelings of excitement too.  She tells me that she mostly worries because there are still times when it is difficult to interact with others and she prefers to be alone.  She had a period of time recently when she wanted to isolate and slept for about 13 hours straight.  She could not go to work at this time. She notes that she engages in negative self-talk and often gets down on herself during these times.  She reports this happening frequently after applying for the job because it took 3 weeks to hear from administration.  She is taking propranolol at least daily and finds this to be helpful.  She uses Seroquel when she has a difficult time sleeping only.  She denies any problems with appetite. She reports compliance with her psychiatric medications.  She is taking Seroquel, Prozac, and Propranolol. She denies any side effects. She denies any SI/HI/AVH.    Current Medications:   Current Outpatient Medications   Medication Sig Dispense Refill    FLUoxetine (PROzac) 40 MG capsule Take 1 capsule by mouth Daily. 30 capsule 2    levonorgestrel (Mirena, 52 MG,) 20 MCG/24HR IUD 1 each by Intrauterine route 1 (One) Time.      multivitamin with minerals tablet tablet Take 1 tablet by mouth Daily.      propranolol (INDERAL) 10 MG tablet Take " "1 tablet by mouth 2 (Two) Times a Day As Needed (anxiety). 60 tablet 2    QUEtiapine (SEROquel) 25 MG tablet Take 1 tablet by mouth Every Night. 30 tablet 2     No current facility-administered medications for this visit.       Objective   Vital Signs:   /70   Pulse 64   Ht 162.6 cm (64\")   Wt 51.3 kg (113 lb)   SpO2 98%   BMI 19.40 kg/m²     Physical Exam  Vitals and nursing note reviewed.   Constitutional:       Appearance: Normal appearance. She is normal weight.   Musculoskeletal:         General: Normal range of motion.   Skin:     General: Skin is warm and dry.   Neurological:      General: No focal deficit present.      Mental Status: She is alert and oriented to person, place, and time.   Psychiatric:         Attention and Perception: Attention normal.         Mood and Affect: Mood normal.         Speech: Speech normal.         Behavior: Behavior normal. Behavior is cooperative.         Thought Content: Thought content normal.         Cognition and Memory: Cognition normal.         Judgment: Judgment normal.        The following data was reviewed by: SONIDO Wooten on 01/03/2024:    Assessment and Plan    Problem List Items Addressed This Visit    None  Visit Diagnoses       ROWENA (generalized anxiety disorder)  (Chronic)   -  Primary    Relevant Medications    FLUoxetine (PROzac) 40 MG capsule    Moderate major depression  (Chronic)       Relevant Medications    FLUoxetine (PROzac) 40 MG capsule    Nicotine dependence with current use  (Chronic)       Marijuana use, continuous  (Chronic)                 Mental Status Exam:   Hygiene:   good  Cooperation:  Cooperative  Eye Contact:  Good  Psychomotor Behavior:  Appropriate  Affect:  Appropriate  Mood: normal  Speech:  Normal  Thought Process:  Goal directed and Linear  Thought Content:  Normal  Suicidal:  None  Homicidal:  None  Hallucinations:  None  Delusion:  None  Memory:  Intact  Orientation:  Person, Place, Time and " Situation  Reliability:  good  Insight:  Good  Judgement:  Good  Impulse Control:  Good  Physical/Medical Issues:  No      PHQ-9 Score:   PHQ-9 Total Score: 11     ROWENA-7 Score:  ROWENA 7 Total Score: 6    Impression/Plan:  -This is a follow up and medication check. Aleyda reports an increase in satiety due to receiving a new position or promotion.  She is excited, but engages in negative self-talk and worries about how she will handle the responsibilities.  She tells me there are still times when she wants to isolate and this job will require her to interact with new people and attend meetings.  She is interested in using propranolol more often.  She mostly uses daily if at all now.  She may consider using 3 times a day for her new position and hopes to use a long-acting version if possible.  She also feels an increase in Prozac could be helpful for anxiety.  She has not had as many depressive episodes recently, but she does have them occasionally where she will isolate and sleep more often.  She sleeps better most nights, but has Seroquel to use if needed.  She denies any changes in appetite.  We discussed the risk and benefits of medication changes and both agreed to increase Prozac and use propranolol more often.  -Increase Prozac to 40 mg for depression and anxiety.   -Increase propranolol 10 mg to 3 times daily as needed for anxiety. Patient has refills.   -Continue Seroquel 25 mg nightly for depression and insomnia.  Patient has refills.  -Consider therapy.       MEDS ORDERED DURING VISIT:  New Medications Ordered This Visit   Medications    FLUoxetine (PROzac) 40 MG capsule     Sig: Take 1 capsule by mouth Daily.     Dispense:  30 capsule     Refill:  2     Follow Up   Return in about 2 months (around 3/3/2024) for Medication Check.  Patient was given instructions and counseling regarding her condition or for health maintenance advice. Please see specific information pulled into the AVS if appropriate.        TREATMENT PLAN/GOALS: Continue supportive psychotherapy efforts and medications as indicated. Treatment and medication options discussed during today's visit. Patient acknowledged and verbally consented to continue with current treatment plan and was educated on the importance of compliance with treatment and follow-up appointments.    MEDICATION ISSUES:  Discussed medication options and treatment plan of prescribed medication as well as the risks, benefits, and side effects including potential falls, possible impaired driving and metabolic adversities among others. Patient is agreeable to call the office with any worsening of symptoms or onset of side effects. Patient is agreeable to call 911, call [phone], or go to the nearest ER should he/she begin having SI/HI.      This document has been electronically signed by SONIDO Wayne, PMHNP-BC  January 3, 2024 08:22 EST    Part of this note may be an electronic transcription/translation of spoken language to printed text using the Dragon Dictation System.

## 2024-01-03 ENCOUNTER — OFFICE VISIT (OUTPATIENT)
Dept: PSYCHIATRY | Facility: CLINIC | Age: 29
End: 2024-01-03
Payer: COMMERCIAL

## 2024-01-03 VITALS
WEIGHT: 113 LBS | HEIGHT: 64 IN | BODY MASS INDEX: 19.29 KG/M2 | HEART RATE: 64 BPM | DIASTOLIC BLOOD PRESSURE: 70 MMHG | SYSTOLIC BLOOD PRESSURE: 102 MMHG | OXYGEN SATURATION: 98 %

## 2024-01-03 DIAGNOSIS — F12.90 MARIJUANA USE, CONTINUOUS: Chronic | ICD-10-CM

## 2024-01-03 DIAGNOSIS — F41.1 GAD (GENERALIZED ANXIETY DISORDER): Primary | Chronic | ICD-10-CM

## 2024-01-03 DIAGNOSIS — F32.1 MODERATE MAJOR DEPRESSION: Chronic | ICD-10-CM

## 2024-01-03 DIAGNOSIS — F17.200 NICOTINE DEPENDENCE WITH CURRENT USE: Chronic | ICD-10-CM

## 2024-01-03 PROCEDURE — 99214 OFFICE O/P EST MOD 30 MIN: CPT | Performed by: NURSE PRACTITIONER

## 2024-01-03 RX ORDER — FLUOXETINE HYDROCHLORIDE 40 MG/1
40 CAPSULE ORAL DAILY
Qty: 30 CAPSULE | Refills: 2 | Status: SHIPPED | OUTPATIENT
Start: 2024-01-03

## 2024-04-03 NOTE — PROGRESS NOTES
"Chief Complaint  Depression, anxiety, nicotine use, and marijuana use    Subjective          Aleyda Brown presents today to [facility] by herself for a follow up and medication check.    History of Present Illness: Aleyda states, \"I am pretty good.\" Aleyda tells me that her new job is going well. She continues to work nights which comes with it's own affects on mood and sleep, but she is preferring this position for now. She had a medical issue recently requiring surgical intervention but tells me it is healing and she is feeling better. She is unsure of the name of the condition, but describes it as having a reoccurring cyst in her upper thigh/groin area which was extremely painful and affected sleep and anxiety. She required antibiotics post-operatively as well, according to the patient. Her anxiety has settled down since the issue is improving. She had not been using Seroquel at the time and forgot to use when she was not sleeping.  She reports compliance with her psychiatric medications.  She is taking Prozac, and Propranolol. She denies any side effects. She denies any SI/HI/AVH.    Current Medications:   Current Outpatient Medications   Medication Sig Dispense Refill    benzoyl peroxide 10 % external wash       FLUoxetine (PROzac) 40 MG capsule Take 1 capsule by mouth Daily. Take in addition to the 10 mg capsule 30 capsule 2    levonorgestrel (Mirena, 52 MG,) 20 MCG/24HR IUD 1 each by Intrauterine route 1 (One) Time.      multivitamin with minerals tablet tablet Take 1 tablet by mouth Daily.      propranolol (INDERAL) 10 MG tablet Take 1 tablet by mouth 2 (Two) Times a Day As Needed (anxiety). 60 tablet 2    FLUoxetine (PROzac) 10 MG capsule Take 1 capsule by mouth Daily. Take in addition to the 40 mg capsule 30 capsule 2     No current facility-administered medications for this visit.       Objective   Vital Signs:   BP 92/66   Pulse 72   Ht 162.6 cm (64\") "   Wt 52.6 kg (116 lb)   SpO2 99%   BMI 19.91 kg/m²     Physical Exam  Vitals and nursing note reviewed.   Constitutional:       Appearance: Normal appearance. She is normal weight.   Musculoskeletal:         General: Normal range of motion.   Skin:     General: Skin is warm and dry.   Neurological:      General: No focal deficit present.      Mental Status: She is alert and oriented to person, place, and time.   Psychiatric:         Attention and Perception: Attention normal.         Mood and Affect: Mood normal.         Speech: Speech normal.         Behavior: Behavior normal. Behavior is cooperative.         Thought Content: Thought content normal.         Cognition and Memory: Cognition normal.         Judgment: Judgment normal.        The following data was reviewed by: SONIDO Wooten on 04/10/2024:    Assessment and Plan    Problem List Items Addressed This Visit    None  Visit Diagnoses       ROWENA (generalized anxiety disorder)  (Chronic)   -  Primary    Relevant Medications    FLUoxetine (PROzac) 40 MG capsule    FLUoxetine (PROzac) 10 MG capsule    propranolol (INDERAL) 10 MG tablet    Mild recurrent major depression  (Chronic)       Relevant Medications    FLUoxetine (PROzac) 40 MG capsule    FLUoxetine (PROzac) 10 MG capsule    Nicotine dependence with current use  (Chronic)                   Mental Status Exam:   Hygiene:   good  Cooperation:  Cooperative  Eye Contact:  Good  Psychomotor Behavior:  Appropriate  Affect:  Appropriate  Mood: normal  Speech:  Normal  Thought Process:  Goal directed and Linear  Thought Content:  Normal  Suicidal:  None  Homicidal:  None  Hallucinations:  None  Delusion:  None  Memory:  Intact  Orientation:  Person, Place, Time and Situation  Reliability:  good  Insight:  Good  Judgement:  Good  Impulse Control:  Good  Physical/Medical Issues:  Yes excision of cyst on left leg      PHQ-9 Score:   PHQ-9 Total Score: 1     ROWENA-7 Score:  ROWENA 7 Total Score:  5    Impression/Plan:  -This is a follow up and medication check. Aleyda reports a stable mood and managed anxiety. Her anxiety had been high for some time due to having a reoccurring cyst causing pain and affecting sleep. She was treated surgically immediately after diagnosis and took antibiotics post-operatively. She is healing and feeling better. She is sleeping better now too. She continues to work and is doing well in her new position. She feels an increase of 10 mg of Prozac would help anxiety, but otherwise, she reports feeling improved overall.   -Stop Seroquel since patient is no longer taking.  -Increase Prozac to 50 mg for depression and anxiety.   -Continue propranolol 10 mg 3 times daily as needed for anxiety.   -Consider therapy.       MEDS ORDERED DURING VISIT:  New Medications Ordered This Visit   Medications    FLUoxetine (PROzac) 40 MG capsule     Sig: Take 1 capsule by mouth Daily. Take in addition to the 10 mg capsule     Dispense:  30 capsule     Refill:  2    FLUoxetine (PROzac) 10 MG capsule     Sig: Take 1 capsule by mouth Daily. Take in addition to the 40 mg capsule     Dispense:  30 capsule     Refill:  2    propranolol (INDERAL) 10 MG tablet     Sig: Take 1 tablet by mouth 2 (Two) Times a Day As Needed (anxiety).     Dispense:  60 tablet     Refill:  2     Follow Up   Return in about 3 months (around 7/10/2024) for Medication Check.  Patient was given instructions and counseling regarding her condition or for health maintenance advice. Please see specific information pulled into the AVS if appropriate.       TREATMENT PLAN/GOALS: Continue supportive psychotherapy efforts and medications as indicated. Treatment and medication options discussed during today's visit. Patient acknowledged and verbally consented to continue with current treatment plan and was educated on the importance of compliance with treatment and follow-up appointments.    MEDICATION ISSUES:  Discussed medication options  and treatment plan of prescribed medication as well as the risks, benefits, and side effects including potential falls, possible impaired driving and metabolic adversities among others. Patient is agreeable to call the office with any worsening of symptoms or onset of side effects. Patient is agreeable to call 911, call [phone], or go to the nearest ER should he/she begin having SI/HI.      This document has been electronically signed by SONIDO Wayne, PMHNP-BC  April 10, 2024 19:22 EDT    Part of this note may be an electronic transcription/translation of spoken language to printed text using the Dragon Dictation System.

## 2024-04-10 ENCOUNTER — OFFICE VISIT (OUTPATIENT)
Dept: PSYCHIATRY | Facility: CLINIC | Age: 29
End: 2024-04-10
Payer: COMMERCIAL

## 2024-04-10 VITALS
WEIGHT: 116 LBS | DIASTOLIC BLOOD PRESSURE: 66 MMHG | HEIGHT: 64 IN | SYSTOLIC BLOOD PRESSURE: 92 MMHG | BODY MASS INDEX: 19.81 KG/M2 | OXYGEN SATURATION: 99 % | HEART RATE: 72 BPM

## 2024-04-10 DIAGNOSIS — F17.200 NICOTINE DEPENDENCE WITH CURRENT USE: Chronic | ICD-10-CM

## 2024-04-10 DIAGNOSIS — F41.1 GAD (GENERALIZED ANXIETY DISORDER): Primary | Chronic | ICD-10-CM

## 2024-04-10 DIAGNOSIS — F33.0 MILD RECURRENT MAJOR DEPRESSION: Chronic | ICD-10-CM

## 2024-04-10 PROCEDURE — 99214 OFFICE O/P EST MOD 30 MIN: CPT | Performed by: NURSE PRACTITIONER

## 2024-04-10 RX ORDER — CLINDAMYCIN HYDROCHLORIDE 300 MG/1
300 CAPSULE ORAL 3 TIMES DAILY
COMMUNITY
Start: 2024-03-21 | End: 2024-04-10

## 2024-04-10 RX ORDER — BENZOYL PEROXIDE 100 MG/ML
LIQUID TOPICAL
COMMUNITY
Start: 2024-04-01

## 2024-04-10 RX ORDER — FLUCONAZOLE 200 MG/1
TABLET ORAL
COMMUNITY
Start: 2024-04-09 | End: 2024-04-10

## 2024-04-10 RX ORDER — FLUOXETINE 10 MG/1
10 CAPSULE ORAL DAILY
Qty: 30 CAPSULE | Refills: 2 | Status: SHIPPED | OUTPATIENT
Start: 2024-04-10 | End: 2025-04-10

## 2024-04-10 RX ORDER — PROPRANOLOL HYDROCHLORIDE 10 MG/1
10 TABLET ORAL 2 TIMES DAILY PRN
Qty: 60 TABLET | Refills: 2 | Status: SHIPPED | OUTPATIENT
Start: 2024-04-10

## 2024-04-10 RX ORDER — FLUOXETINE HYDROCHLORIDE 40 MG/1
40 CAPSULE ORAL DAILY
Qty: 30 CAPSULE | Refills: 2 | Status: SHIPPED | OUTPATIENT
Start: 2024-04-10

## 2024-05-06 ENCOUNTER — TELEPHONE (OUTPATIENT)
Dept: PSYCHIATRY | Facility: CLINIC | Age: 29
End: 2024-05-06
Payer: COMMERCIAL

## 2024-05-06 DIAGNOSIS — F41.1 GAD (GENERALIZED ANXIETY DISORDER): Chronic | ICD-10-CM

## 2024-05-06 RX ORDER — PROPRANOLOL HYDROCHLORIDE 20 MG/1
20 TABLET ORAL 2 TIMES DAILY
Qty: 60 TABLET | Refills: 2 | Status: SHIPPED | OUTPATIENT
Start: 2024-05-06

## 2024-05-13 ENCOUNTER — TELEPHONE (OUTPATIENT)
Dept: PSYCHIATRY | Facility: CLINIC | Age: 29
End: 2024-05-13
Payer: COMMERCIAL

## 2024-05-13 DIAGNOSIS — F41.1 GAD (GENERALIZED ANXIETY DISORDER): Primary | ICD-10-CM

## 2024-05-13 NOTE — TELEPHONE ENCOUNTER
If she is not taking Seroquel, we can try Clonidine which is very good for anxiety, but taking at night instead of as needed because it makes individuals sleepy. Let me know.

## 2024-05-13 NOTE — TELEPHONE ENCOUNTER
Patient called in requesting a sooner visit to speak to Johanna about possibly changing her Propranolol to something else. She is taking the 20 mg twice a day and has been for one week but it is not helping she has a constant worry all day about everything she said her job, life and etc. She said she did not go to therapy due to she is working a lot. She said she would be open to a med change. Do you want me to work Patient in this week? Please advise

## 2024-05-14 RX ORDER — CLONIDINE HYDROCHLORIDE 0.1 MG/1
0.1 TABLET ORAL NIGHTLY
Qty: 30 TABLET | Refills: 2 | Status: SHIPPED | OUTPATIENT
Start: 2024-05-14

## 2024-05-14 NOTE — TELEPHONE ENCOUNTER
Spoke to Patient she said she was not taking Seroquel and was open to try the Clonidine, please send to [facility] [place], [place].

## 2024-05-14 NOTE — TELEPHONE ENCOUNTER
Hold Propranolol while starting Clonidine. She will take one tablet about an hour before ready to go to sleep.

## 2024-06-05 NOTE — PROGRESS NOTES
"Chief Complaint  Depression, anxiety, nicotine use, and marijuana use    Subjective          Aleyda Brown presents today to [facility] by herself for a follow up and medication check.    History of Present Illness: Aleyda states, \"my depression has been good, but my anxiety.\" Aleyda tells me she still struggles with anxiety in regards to work. 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 Her sheree is not often available, so she wonders if she is doing a good job. She has tried to figure out his schedule and communicate more often. The Clonidine helps, but she still uses Propranolol while working as needed. She is sleeping and eating.  She reports compliance with her psychiatric medications.  She is taking Clonidine, Prozac, and Propranolol. She denies any side effects. She denies any SI/HI/AVH.    Current Medications:   Current Outpatient Medications   Medication Sig Dispense Refill    benzoyl peroxide 10 % external wash       cloNIDine (Catapres) 0.1 MG tablet Take 1 tablet by mouth Every Night. 30 tablet 2    FLUoxetine (PROzac) 10 MG capsule Take 1 capsule by mouth Daily. Take in addition to the 40 mg capsule 30 capsule 2    FLUoxetine (PROzac) 40 MG capsule Take 1 capsule by mouth Daily. Take in addition to the 10 mg capsule 30 capsule 2    levonorgestrel (Mirena, 52 MG,) 20 MCG/24HR IUD 1 each by Intrauterine route 1 (One) Time.      multivitamin with minerals tablet tablet Take 1 tablet by mouth Daily.      propranolol (INDERAL) 20 MG tablet Take 1 tablet by mouth 2 (Two) Times a Day. 60 tablet 2     No current facility-administered medications for this visit.       Objective   Vital Signs:   BP 98/64   Pulse 68   Ht 162.6 cm (64\")   Wt 50.8 kg (112 lb)   SpO2 (!) 9%   BMI 19.22 kg/m²     Physical Exam  Vitals and nursing note reviewed.   Constitutional:       Appearance: Normal appearance. She is normal weight. "   Musculoskeletal:         General: Normal range of motion.   Skin:     General: Skin is warm and dry.   Neurological:      General: No focal deficit present.      Mental Status: She is alert and oriented to person, place, and time.   Psychiatric:         Attention and Perception: Attention normal.         Mood and Affect: Mood normal.         Speech: Speech normal.         Behavior: Behavior normal. Behavior is cooperative.         Thought Content: Thought content normal.         Cognition and Memory: Cognition normal.         Judgment: Judgment normal.        The following data was reviewed by: SONIDO Wooten on 06/10/2024:    Assessment and Plan    Problem List Items Addressed This Visit    None  Visit Diagnoses       ROWENA (generalized anxiety disorder)  (Chronic)   -  Primary    Mild recurrent major depression  (Chronic)       Nicotine dependence with current use  (Chronic)       Marijuana use, continuous  (Chronic)                     Mental Status Exam:   Hygiene:   good  Cooperation:  Cooperative  Eye Contact:  Good  Psychomotor Behavior:  Appropriate  Affect:  Appropriate  Mood: normal  Speech:  Normal  Thought Process:  Goal directed and Linear  Thought Content:  Normal  Suicidal:  None  Homicidal:  None  Hallucinations:  None  Delusion:  None  Memory:  Intact  Orientation:  Person, Place, Time and Situation  Reliability:  good  Insight:  Good  Judgement:  Good  Impulse Control:  Good  Physical/Medical Issues:  No      PHQ-9 Score:   PHQ-9 Total Score: 6     ROWENA-7 Score:  ROWENA 7 Total Score: 12    Impression/Plan:  -This is a follow up and medication check. Aleyda reports improved depression, but ongoing anxiety related to work, job stress, and social anxiety. The clonidine is helping after work and she is using Propranolol as needed. She has been cautious due to history of low blood pressure. I reminded her she can take the 10 mg Propranolol with each shift if needed rather than waiting  for the full 20 mg as needed. She may consider. Otherwise, she is pleased with her medication regiment and would like to continue at current doses. Patient is aware of provider's schedule change and would like to continue with current provider.   -Stop Seroquel since patient is no longer taking.  -Continue Clonidine 0.1 mg nightly for anxiety. Patient has refills.   -Continue Prozac 50 mg for depression and anxiety. Patient has refills.   -Continue propranolol 20 mg 2 times daily as needed for anxiety. Patient has refills.   -Consider therapy.       MEDS ORDERED DURING VISIT:  No orders of the defined types were placed in this encounter.    Follow Up   Return in about 3 months (around 9/10/2024) for Medication Check.  Patient was given instructions and counseling regarding her condition or for health maintenance advice. Please see specific information pulled into the AVS if appropriate.       TREATMENT PLAN/GOALS: Continue supportive psychotherapy efforts and medications as indicated. Treatment and medication options discussed during today's visit. Patient acknowledged and verbally consented to continue with current treatment plan and was educated on the importance of compliance with treatment and follow-up appointments.    MEDICATION ISSUES:  Discussed medication options and treatment plan of prescribed medication as well as the risks, benefits, and side effects including potential falls, possible impaired driving and metabolic adversities among others. Patient is agreeable to call the office with any worsening of symptoms or onset of side effects. Patient is agreeable to call 911, call [phone], or go to the nearest ER should he/she begin having SI/HI.      This document has been electronically signed by SONIDO Wayne, PMHNP-BC  Selena 10, 2024 08:19 EDT    Part of this note may be an electronic transcription/translation of spoken language to printed text using the Dragon Dictation System.

## 2024-06-10 ENCOUNTER — OFFICE VISIT (OUTPATIENT)
Dept: PSYCHIATRY | Facility: CLINIC | Age: 29
End: 2024-06-10
Payer: COMMERCIAL

## 2024-06-10 VITALS
HEIGHT: 64 IN | DIASTOLIC BLOOD PRESSURE: 64 MMHG | OXYGEN SATURATION: 9 % | WEIGHT: 112 LBS | BODY MASS INDEX: 19.12 KG/M2 | HEART RATE: 68 BPM | SYSTOLIC BLOOD PRESSURE: 98 MMHG

## 2024-06-10 DIAGNOSIS — F41.1 GAD (GENERALIZED ANXIETY DISORDER): Primary | Chronic | ICD-10-CM

## 2024-06-10 DIAGNOSIS — F33.0 MILD RECURRENT MAJOR DEPRESSION: Chronic | ICD-10-CM

## 2024-06-10 DIAGNOSIS — F12.90 MARIJUANA USE, CONTINUOUS: Chronic | ICD-10-CM

## 2024-06-10 DIAGNOSIS — F17.200 NICOTINE DEPENDENCE WITH CURRENT USE: Chronic | ICD-10-CM

## 2024-06-10 PROCEDURE — 99214 OFFICE O/P EST MOD 30 MIN: CPT | Performed by: NURSE PRACTITIONER

## 2024-09-09 NOTE — PROGRESS NOTES
"Chief Complaint  Depression, anxiety, nicotine use, and marijuana use    Subjective          Aleyda Brown presents today to [facility] by herself for a follow up and medication check.    History of Present Illness: Aleyda states, \" I have been pretty good.\"  Aleyda tells me that she is doing well at work.  She stopped propranolol and tried clonidine at night.  It was helping her sleep, but she had concerns for her blood pressure being too low.  She would feel sluggish and off balance.  She also had blurry vision.  She presented to the emergency department with low blood pressure and to her primary care with low blood pressure, so she stopped the clonidine.  She feels her mood has been stable and everything is going well in her life.  She is trying to work on healthy lifestyle adjustments and do activities that she knows will help her mental health like going to the gym.  She has also been riding dirt bikes.  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  She finds this has been easier for her since her anxiety is more managed.  She denies any problems with sleep or appetite at this time. She reports compliance with her psychiatric medications.  She is taking Clonidine, Prozac, and Propranolol. She denies any side effects. She denies any SI/HI/AVH.    Current Medications:   Current Outpatient Medications   Medication Sig Dispense Refill    benzoyl peroxide 10 % external wash       FLUoxetine (PROzac) 10 MG capsule Take 1 capsule by mouth Daily. Take in addition to the 40 mg capsule 90 capsule 1    FLUoxetine (PROzac) 40 MG capsule Take 1 capsule by mouth Daily. Take in addition to the 10 mg capsule 90 capsule 1    levonorgestrel (Mirena, 52 MG,) 20 MCG/24HR IUD 1 each by Intrauterine route 1 (One) Time.      multivitamin with minerals tablet tablet Take 1 tablet by mouth Daily.      propranolol (INDERAL) 20 MG tablet Take 1 tablet by mouth " "2 (Two) Times a Day. (Patient not taking: Reported on 9/11/2024) 60 tablet 2     No current facility-administered medications for this visit.       Objective   Vital Signs:   /62   Pulse 67   Ht 162.6 cm (64\")   Wt 53.3 kg (117 lb 6.4 oz)   SpO2 98%   BMI 20.15 kg/m²     Physical Exam  Vitals and nursing note reviewed.   Constitutional:       Appearance: Normal appearance. She is normal weight.   Musculoskeletal:         General: Normal range of motion.   Skin:     General: Skin is warm and dry.   Neurological:      General: No focal deficit present.      Mental Status: She is alert and oriented to person, place, and time.   Psychiatric:         Attention and Perception: Attention normal.         Mood and Affect: Mood normal.         Speech: Speech normal.         Behavior: Behavior normal. Behavior is cooperative.         Thought Content: Thought content normal.         Cognition and Memory: Cognition normal.         Judgment: Judgment normal.        The following data was reviewed by: SONIDO Wooten on 09/11/2024:    Assessment and Plan    Problem List Items Addressed This Visit    None  Visit Diagnoses       ROWENA (generalized anxiety disorder)  (Chronic)   -  Primary    Relevant Medications    FLUoxetine (PROzac) 40 MG capsule    FLUoxetine (PROzac) 10 MG capsule    Mild recurrent major depression  (Chronic)       Relevant Medications    FLUoxetine (PROzac) 40 MG capsule    FLUoxetine (PROzac) 10 MG capsule    Nicotine dependence with current use  (Chronic)       Marijuana use, continuous  (Chronic)                       Mental Status Exam:   Hygiene:   good  Cooperation:  Cooperative  Eye Contact:  Good  Psychomotor Behavior:  Appropriate  Affect:  Appropriate  Mood: normal  Speech:  Normal  Thought Process:  Goal directed and Linear  Thought Content:  Normal  Suicidal:  None  Homicidal:  None  Hallucinations:  None  Delusion:  None  Memory:  Intact  Orientation:  Person, Place, Time " and Situation  Reliability:  good  Insight:  Good  Judgement:  Good  Impulse Control:  Good  Physical/Medical Issues:  Yes hypotension related to medication      PHQ-9 Score:   PHQ-9 Total Score: 8     ROWENA-7 Score:  ROWENA 7 Total Score: 4    Impression/Plan:  -This is a follow up and medication check. Aleyda reports feeling a stable mood.  She is also working on techniques to manage anxiety.  She finds these techniques to be helpful.  We discussed using a CBT type workbook to manage negative thought patterns.  She may consider this.  She had to stop clonidine due to periods of hypotension.  She is feeling well without the medication and she is still sleeping well.  At this time, she would like to continue Prozac and hold as needed medications for anxiety.  -Stop clonidine due to hypotension  -Hold propranolol since patient's anxiety has been well managed with healthy lifestyle adjustments  -Continue Prozac 50 mg for depression and anxiety.   -Consider therapy or CBT workbook.       MEDS ORDERED DURING VISIT:  New Medications Ordered This Visit   Medications    FLUoxetine (PROzac) 40 MG capsule     Sig: Take 1 capsule by mouth Daily. Take in addition to the 10 mg capsule     Dispense:  90 capsule     Refill:  1    FLUoxetine (PROzac) 10 MG capsule     Sig: Take 1 capsule by mouth Daily. Take in addition to the 40 mg capsule     Dispense:  90 capsule     Refill:  1     Follow Up   Return in about 3 months (around 12/11/2024) for Medication Check.  Patient was given instructions and counseling regarding her condition or for health maintenance advice. Please see specific information pulled into the AVS if appropriate.       TREATMENT PLAN/GOALS: Continue supportive psychotherapy efforts and medications as indicated. Treatment and medication options discussed during today's visit. Patient acknowledged and verbally consented to continue with current treatment plan and was educated on the importance of compliance with  treatment and follow-up appointments.    MEDICATION ISSUES:  Discussed medication options and treatment plan of prescribed medication as well as the risks, benefits, and side effects including potential falls, possible impaired driving and metabolic adversities among others. Patient is agreeable to call the office with any worsening of symptoms or onset of side effects. Patient is agreeable to call 911, call [phone], or go to the nearest ER should he/she begin having SI/HI.      This document has been electronically signed by SONIDO Wayne, PMHNP-BC  September 11, 2024 14:43 EDT    Part of this note may be an electronic transcription/translation of spoken language to printed text using the Dragon Dictation System.

## 2024-09-11 ENCOUNTER — OFFICE VISIT (OUTPATIENT)
Dept: PSYCHIATRY | Facility: CLINIC | Age: 29
End: 2024-09-11
Payer: COMMERCIAL

## 2024-09-11 VITALS
DIASTOLIC BLOOD PRESSURE: 62 MMHG | OXYGEN SATURATION: 98 % | HEART RATE: 67 BPM | HEIGHT: 64 IN | SYSTOLIC BLOOD PRESSURE: 108 MMHG | WEIGHT: 117.4 LBS | BODY MASS INDEX: 20.04 KG/M2

## 2024-09-11 DIAGNOSIS — F12.90 MARIJUANA USE, CONTINUOUS: Chronic | ICD-10-CM

## 2024-09-11 DIAGNOSIS — F17.200 NICOTINE DEPENDENCE WITH CURRENT USE: Chronic | ICD-10-CM

## 2024-09-11 DIAGNOSIS — F33.0 MILD RECURRENT MAJOR DEPRESSION: Chronic | ICD-10-CM

## 2024-09-11 DIAGNOSIS — F41.1 GAD (GENERALIZED ANXIETY DISORDER): Primary | Chronic | ICD-10-CM

## 2024-09-11 PROCEDURE — 99214 OFFICE O/P EST MOD 30 MIN: CPT | Performed by: NURSE PRACTITIONER

## 2024-09-11 RX ORDER — FLUOXETINE 40 MG/1
40 CAPSULE ORAL DAILY
Qty: 90 CAPSULE | Refills: 1 | Status: SHIPPED | OUTPATIENT
Start: 2024-09-11

## 2024-09-11 RX ORDER — FLUOXETINE 10 MG/1
10 CAPSULE ORAL DAILY
Qty: 90 CAPSULE | Refills: 1 | Status: SHIPPED | OUTPATIENT
Start: 2024-09-11 | End: 2025-09-11

## 2024-09-25 ENCOUNTER — TELEPHONE (OUTPATIENT)
Dept: PSYCHIATRY | Facility: CLINIC | Age: 29
End: 2024-09-25
Payer: COMMERCIAL

## 2024-09-25 DIAGNOSIS — F41.1 GAD (GENERALIZED ANXIETY DISORDER): Primary | ICD-10-CM

## 2024-09-25 RX ORDER — CLONIDINE HYDROCHLORIDE 0.1 MG/1
0.1 TABLET ORAL NIGHTLY
Qty: 30 TABLET | Refills: 2 | Status: SHIPPED | OUTPATIENT
Start: 2024-09-25

## 2024-10-09 ENCOUNTER — TRANSCRIBE ORDERS (OUTPATIENT)
Dept: ADMINISTRATIVE | Facility: HOSPITAL | Age: 29
End: 2024-10-09
Payer: COMMERCIAL

## 2024-10-09 DIAGNOSIS — Z13.83 ENCOUNTER FOR SCREENING FOR RESPIRATORY DISORDER NEC: Primary | ICD-10-CM

## 2024-10-11 ENCOUNTER — HOSPITAL ENCOUNTER (OUTPATIENT)
Dept: GENERAL RADIOLOGY | Facility: HOSPITAL | Age: 29
Discharge: HOME | End: 2024-10-11
Payer: COMMERCIAL

## 2024-10-11 DIAGNOSIS — Z13.83 ENCOUNTER FOR SCREENING FOR RESPIRATORY DISORDER NEC: ICD-10-CM

## 2024-10-11 PROCEDURE — 71046 X-RAY EXAM CHEST 2 VIEWS: CPT

## 2024-12-19 ENCOUNTER — OFFICE VISIT (OUTPATIENT)
Dept: OBSTETRICS AND GYNECOLOGY | Facility: CLINIC | Age: 29
End: 2024-12-19
Payer: COMMERCIAL

## 2024-12-19 VITALS
SYSTOLIC BLOOD PRESSURE: 108 MMHG | DIASTOLIC BLOOD PRESSURE: 60 MMHG | BODY MASS INDEX: 19.12 KG/M2 | HEIGHT: 64 IN | WEIGHT: 112 LBS

## 2024-12-19 DIAGNOSIS — Z97.5 IUD (INTRAUTERINE DEVICE) IN PLACE: ICD-10-CM

## 2024-12-19 DIAGNOSIS — N92.1 MENORRHAGIA WITH IRREGULAR CYCLE: Primary | ICD-10-CM

## 2024-12-19 DIAGNOSIS — N94.6 DYSMENORRHEA: ICD-10-CM

## 2024-12-19 DIAGNOSIS — F41.8 ANXIETY WITH DEPRESSION: ICD-10-CM

## 2024-12-19 PROCEDURE — 99204 OFFICE O/P NEW MOD 45 MIN: CPT | Performed by: OBSTETRICS & GYNECOLOGY

## 2024-12-20 LAB
ALBUMIN SERPL-MCNC: 4.8 G/DL (ref 3.5–5.2)
ALBUMIN/GLOB SERPL: 1.9 G/DL
ALP SERPL-CCNC: 49 U/L (ref 39–117)
ALT SERPL-CCNC: 13 U/L (ref 1–33)
AST SERPL-CCNC: 19 U/L (ref 1–32)
BASOPHILS # BLD AUTO: 0.02 10*3/MM3 (ref 0–0.2)
BASOPHILS NFR BLD AUTO: 0.4 % (ref 0–1.5)
BILIRUB SERPL-MCNC: 0.4 MG/DL (ref 0–1.2)
BUN SERPL-MCNC: 10 MG/DL (ref 6–20)
BUN/CREAT SERPL: 15.4 (ref 7–25)
CALCIUM SERPL-MCNC: 9.5 MG/DL (ref 8.6–10.5)
CHLORIDE SERPL-SCNC: 104 MMOL/L (ref 98–107)
CO2 SERPL-SCNC: 26.8 MMOL/L (ref 22–29)
CREAT SERPL-MCNC: 0.65 MG/DL (ref 0.57–1)
EGFRCR SERPLBLD CKD-EPI 2021: 123.2 ML/MIN/1.73
EOSINOPHIL # BLD AUTO: 0.06 10*3/MM3 (ref 0–0.4)
EOSINOPHIL NFR BLD AUTO: 1.2 % (ref 0.3–6.2)
ERYTHROCYTE [DISTWIDTH] IN BLOOD BY AUTOMATED COUNT: 11.6 % (ref 12.3–15.4)
GLOBULIN SER CALC-MCNC: 2.5 GM/DL
GLUCOSE SERPL-MCNC: 82 MG/DL (ref 65–99)
HCT VFR BLD AUTO: 38.8 % (ref 34–46.6)
HGB BLD-MCNC: 13.4 G/DL (ref 12–15.9)
IMM GRANULOCYTES # BLD AUTO: 0.01 10*3/MM3 (ref 0–0.05)
IMM GRANULOCYTES NFR BLD AUTO: 0.2 % (ref 0–0.5)
LYMPHOCYTES # BLD AUTO: 1.77 10*3/MM3 (ref 0.7–3.1)
LYMPHOCYTES NFR BLD AUTO: 34.1 % (ref 19.6–45.3)
MCH RBC QN AUTO: 31.6 PG (ref 26.6–33)
MCHC RBC AUTO-ENTMCNC: 34.5 G/DL (ref 31.5–35.7)
MCV RBC AUTO: 91.5 FL (ref 79–97)
MONOCYTES # BLD AUTO: 0.39 10*3/MM3 (ref 0.1–0.9)
MONOCYTES NFR BLD AUTO: 7.5 % (ref 5–12)
NEUTROPHILS # BLD AUTO: 2.94 10*3/MM3 (ref 1.7–7)
NEUTROPHILS NFR BLD AUTO: 56.6 % (ref 42.7–76)
NRBC BLD AUTO-RTO: 0 /100 WBC (ref 0–0.2)
PLATELET # BLD AUTO: 214 10*3/MM3 (ref 140–450)
POTASSIUM SERPL-SCNC: 3.9 MMOL/L (ref 3.5–5.2)
PROT SERPL-MCNC: 7.3 G/DL (ref 6–8.5)
RBC # BLD AUTO: 4.24 10*6/MM3 (ref 3.77–5.28)
SODIUM SERPL-SCNC: 140 MMOL/L (ref 136–145)
T3FREE SERPL-MCNC: 3.1 PG/ML (ref 2–4.4)
T4 FREE SERPL-MCNC: 1.39 NG/DL (ref 0.92–1.68)
TSH SERPL DL<=0.005 MIU/L-ACNC: 1.31 UIU/ML (ref 0.27–4.2)
WBC # BLD AUTO: 5.19 10*3/MM3 (ref 3.4–10.8)

## 2025-01-13 NOTE — PROGRESS NOTES
"Chief Complaint  Depression, anxiety, nicotine use, and marijuana use    Subjective          Aleyda Brown presents today to [facility] by herself for a follow up and medication check.    History of Present Illness: Aleyda states, \"I have been okay, but I dropped the ball.\"  Aleyda tells me that she accidentally left her medication behind in October while traveling to [place] for a death in the family.  She was off her medication for about a week and forgot to resume upon her return home.  Since then, she's had an exacerbation of depression and worsening of anxiety.  She reports having a \"breakdown\" with her mother and having passive suicidal ideations.  She adamantly denies any intent or plan for suicide and identifies her immediate family as protective against suicide.  She tells me it has been hard to engage in daily functioning, but she is going to work.  She remains in  and management.  She denies any difficulty with sleep.  She denies any problems with appetite, but she has lost 5 pounds since her visit in September.  She has found that, since stopping Prozac, she has been on her menstrual cycle for a month long.  She has a IUD in place. She denies any HI/AVH.    Current Medications:   Current Outpatient Medications   Medication Sig Dispense Refill    benzoyl peroxide 10 % external wash       Desvenlafaxine Succinate ER 25 MG tablet sustained-release 24 hour Take 1 tablet by mouth Daily for 14 days, THEN 2 tablets Daily for 14 days. 42 tablet 0    levonorgestrel (Mirena, 52 MG,) 20 MCG/24HR IUD 1 each by Intrauterine route 1 (One) Time.      multivitamin with minerals tablet tablet Take 1 tablet by mouth Daily.      propranolol (INDERAL) 10 MG tablet Take 1 tablet by mouth 3 (Three) Times a Day As Needed (anxiety). 90 tablet 2     No current facility-administered medications for this visit.       Objective   Vital Signs:   BP 92/70   Pulse 88 " "  Ht 162.6 cm (64\")   Wt 51 kg (112 lb 6.4 oz)   SpO2 98%   BMI 19.29 kg/m²     Physical Exam  Vitals and nursing note reviewed.   Constitutional:       Appearance: Normal appearance. She is normal weight.   Musculoskeletal:         General: Normal range of motion.   Skin:     General: Skin is warm and dry.   Neurological:      General: No focal deficit present.      Mental Status: She is alert and oriented to person, place, and time.   Psychiatric:         Attention and Perception: Attention normal.         Mood and Affect: Mood is depressed. Affect is blunt.         Speech: Speech normal.         Behavior: Behavior normal. Behavior is cooperative.         Thought Content: Thought content normal. Thought content includes suicidal (Passive without intent or plan) ideation.         Cognition and Memory: Cognition normal.         Judgment: Judgment normal.        The following data was reviewed by: SONIDO Wooten on 01/16/2025:    TSH (12/19/2024 10:50)  T4, Free (12/19/2024 10:50)  T3, Free (12/19/2024 10:50)  Comprehensive Metabolic Panel (12/19/2024 10:50)  CBC & Differential (12/19/2024 10:50)  Office Visit with Yolis Vasquez MD (12/19/2024)     Assessment and Plan    Problem List Items Addressed This Visit    None  Visit Diagnoses       Moderate major depression  (Chronic)   -  Primary    Relevant Medications    Desvenlafaxine Succinate ER 25 MG tablet sustained-release 24 hour    ROWENA (generalized anxiety disorder)  (Chronic)       Relevant Medications    propranolol (INDERAL) 10 MG tablet    Desvenlafaxine Succinate ER 25 MG tablet sustained-release 24 hour    Nicotine dependence with current use  (Chronic)       Marijuana use, continuous  (Chronic)               Mental Status Exam:   Hygiene:   good  Cooperation:  Cooperative  Eye Contact:  Good  Psychomotor Behavior:  Appropriate  Affect:  Blunted  Mood: depressed  Speech:  Normal  Thought Process:  Goal directed and Linear  Thought " Content:  Normal  Suicidal:  Suicidal Ideation and passive without intent or plan  Homicidal:  None  Hallucinations:  None  Delusion:  None  Memory:  Intact  Orientation:  Person, Place, Time and Situation  Reliability:  good  Insight:  Good  Judgement:  Good  Impulse Control:  Good  Physical/Medical Issues:  Yes hypotension related to medication      PHQ-9 Score:   PHQ-9 Total Score: 16      ROWENA-7 Score:  ROWENA 7 Total Score: 14    Impression/Plan:  -This is a follow up and medication check. Aleyda reports an exacerbation of depression and anxiety.  She has been without her psychiatric medications since October.  She made an attempt to remain off psychiatric medications; however, she notes that it has been difficult to function on a daily basis.  She has been able to go to work, but has not been performing to her best ability.  She notes passive suicidal ideations at times when her depression is severe; however, she adamantly denies any intent or plan for suicide.  She is open to safety planning and agrees to talking with her mother About resuming medications and monitoring her mood.  She also agrees to removing any weapons from the home while adjusting her psychiatric medication.  Today, we discussed resuming Prozac versus trying Pristiq.  I explained the mechanism of action, purpose, risk, benefits, and potential adverse effects of Pristiq.  She believes this may be a good option for her at this time.  We discussed timing of administration and adjusting doses to titrate upwards.  Stop Prozac and clonidine since patient is no longer taking.  -Initiate Pristiq 25 mg daily for 2 weeks then increase to 50 mg daily for 2 weeks for depression and anxiety.  -Resume propranolol 10 mg 3 times daily as needed for anxiety.  -Consider therapy or CBT workbook.       MEDS ORDERED DURING VISIT:  New Medications Ordered This Visit   Medications    propranolol (INDERAL) 10 MG tablet     Sig: Take 1 tablet by mouth 3 (Three) Times  a Day As Needed (anxiety).     Dispense:  90 tablet     Refill:  2    Desvenlafaxine Succinate ER 25 MG tablet sustained-release 24 hour     Sig: Take 1 tablet by mouth Daily for 14 days, THEN 2 tablets Daily for 14 days.     Dispense:  42 tablet     Refill:  0     Follow Up   Return in about 4 weeks (around 2/13/2025) for Medication Check.  Patient was given instructions and counseling regarding her condition or for health maintenance advice. Please see specific information pulled into the AVS if appropriate.       TREATMENT PLAN/GOALS: Continue supportive psychotherapy efforts and medications as indicated. Treatment and medication options discussed during today's visit. Patient acknowledged and verbally consented to continue with current treatment plan and was educated on the importance of compliance with treatment and follow-up appointments.    MEDICATION ISSUES:  Discussed medication options and treatment plan of prescribed medication as well as the risks, benefits, and side effects including potential falls, possible impaired driving and metabolic adversities among others. Patient is agreeable to call the office with any worsening of symptoms or onset of side effects. Patient is agreeable to call 911, call [phone], or go to the nearest ER should he/she begin having SI/HI.      This document has been electronically signed by SONIDO Wayne, PMHNP-BC  January 16, 2025 14:10 EST    Part of this note may be an electronic transcription/translation of spoken language to printed text using the Dragon Dictation System.

## 2025-01-16 ENCOUNTER — OFFICE VISIT (OUTPATIENT)
Dept: PSYCHIATRY | Facility: CLINIC | Age: 30
End: 2025-01-16
Payer: COMMERCIAL

## 2025-01-16 VITALS
WEIGHT: 112.4 LBS | HEART RATE: 88 BPM | OXYGEN SATURATION: 98 % | HEIGHT: 64 IN | DIASTOLIC BLOOD PRESSURE: 70 MMHG | BODY MASS INDEX: 19.19 KG/M2 | SYSTOLIC BLOOD PRESSURE: 92 MMHG

## 2025-01-16 DIAGNOSIS — F12.90 MARIJUANA USE, CONTINUOUS: Chronic | ICD-10-CM

## 2025-01-16 DIAGNOSIS — F17.200 NICOTINE DEPENDENCE WITH CURRENT USE: Chronic | ICD-10-CM

## 2025-01-16 DIAGNOSIS — F41.1 GAD (GENERALIZED ANXIETY DISORDER): Chronic | ICD-10-CM

## 2025-01-16 DIAGNOSIS — F32.1 MODERATE MAJOR DEPRESSION: Primary | Chronic | ICD-10-CM

## 2025-01-16 PROCEDURE — 99214 OFFICE O/P EST MOD 30 MIN: CPT | Performed by: NURSE PRACTITIONER

## 2025-01-16 RX ORDER — DESVENLAFAXINE 25 MG/1
TABLET, EXTENDED RELEASE ORAL
Qty: 42 TABLET | Refills: 0 | Status: SHIPPED | OUTPATIENT
Start: 2025-01-16 | End: 2025-02-13

## 2025-01-16 RX ORDER — PROPRANOLOL HYDROCHLORIDE 10 MG/1
10 TABLET ORAL 3 TIMES DAILY PRN
Qty: 90 TABLET | Refills: 2 | Status: SHIPPED | OUTPATIENT
Start: 2025-01-16

## 2025-02-19 ENCOUNTER — TELEPHONE (OUTPATIENT)
Dept: OBSTETRICS AND GYNECOLOGY | Facility: CLINIC | Age: 30
End: 2025-02-19

## 2025-02-19 NOTE — TELEPHONE ENCOUNTER
Caller: Aleyda Brown    Relationship:  Self    Best call back number: [phone]     PATIENT CALLED REQUESTING TO CANCEL SAME DAY APPT.    Did the patient call AFTER the start time of their scheduled appointment?  []YES  [x]NO    Was the patient's appointment rescheduled? [x]YES  []NO 03/26    Any additional information: PATIENT CALLED TO RESCHEDULE TODAY'S ANNUAL WITH  AT 9:10 AM DUE TO SNOW.

## 2025-02-24 ENCOUNTER — TELEPHONE (OUTPATIENT)
Dept: PSYCHIATRY | Facility: CLINIC | Age: 30
End: 2025-02-24
Payer: COMMERCIAL

## 2025-02-24 DIAGNOSIS — F41.1 GAD (GENERALIZED ANXIETY DISORDER): Chronic | ICD-10-CM

## 2025-02-24 DIAGNOSIS — F32.1 MODERATE MAJOR DEPRESSION: Chronic | ICD-10-CM

## 2025-02-24 RX ORDER — DESVENLAFAXINE 50 MG/1
50 TABLET, FILM COATED, EXTENDED RELEASE ORAL DAILY
Qty: 30 TABLET | Refills: 2 | Status: SHIPPED | OUTPATIENT
Start: 2025-02-24

## 2025-02-24 NOTE — TELEPHONE ENCOUNTER
Pt called requesting a refill of Desvenlafaxine. Pt denies any adverse side effects to the medication and states that 50mg is working well. Pharmacy verified in chart. Please advise sending. Thanks!

## 2025-02-24 NOTE — TELEPHONE ENCOUNTER
Rx Refill Note  Requested Prescriptions     Pending Prescriptions Disp Refills    Desvenlafaxine Succinate ER 25 MG tablet sustained-release 24 hour 42 tablet 0     Sig: Take 1 tablet by mouth Daily for 14 days, THEN 2 tablets Daily for 14 days.      Last office visit with prescribing clinician: 1/16/2025   Last telemedicine visit with prescribing clinician: Visit date not found   Next office visit with prescribing clinician: 2/26/2025                         Would you like a call back once the refill request has been completed: [] Yes [] No    If the office needs to give you a call back, can they leave a voicemail: [] Yes [] No    Arik Falcon MA  02/24/25, 09:16 EST

## 2025-06-25 ENCOUNTER — HOSPITAL ENCOUNTER (EMERGENCY)
Facility: HOSPITAL | Age: 30
Discharge: HOME | End: 2025-06-25
Attending: STUDENT IN AN ORGANIZED HEALTH CARE EDUCATION/TRAINING PROGRAM | Admitting: STUDENT IN AN ORGANIZED HEALTH CARE EDUCATION/TRAINING PROGRAM
Payer: COMMERCIAL

## 2025-06-25 VITALS
TEMPERATURE: 98 F | SYSTOLIC BLOOD PRESSURE: 121 MMHG | HEART RATE: 85 BPM | WEIGHT: 115 LBS | HEIGHT: 64 IN | RESPIRATION RATE: 18 BRPM | DIASTOLIC BLOOD PRESSURE: 78 MMHG | OXYGEN SATURATION: 98 % | BODY MASS INDEX: 19.63 KG/M2

## 2025-06-25 DIAGNOSIS — L72.3 INFLAMED SEBACEOUS CYST: Primary | ICD-10-CM

## 2025-06-25 PROCEDURE — 99282 EMERGENCY DEPT VISIT SF MDM: CPT | Performed by: STUDENT IN AN ORGANIZED HEALTH CARE EDUCATION/TRAINING PROGRAM

## 2025-06-25 RX ORDER — CEPHALEXIN 500 MG/1
500 CAPSULE ORAL 4 TIMES DAILY
Qty: 28 CAPSULE | Refills: 0 | Status: SHIPPED | OUTPATIENT
Start: 2025-06-25 | End: 2025-07-02

## 2025-06-25 NOTE — ED PROVIDER NOTES
"Subjective:  History of Present Illness:    Patient is a 29-year-old female without terming health history.  Presents to the ER today for lump to waistline.  Patient reports that she has had hard lump to waistline for approximately 2 years.  Reports over the last couple days this area has become red and inflamed.  She denies drainage.  Denies OTC medication or home remedy.  Denies alleviating or exacerbating factors    Nurses Notes reviewed and agree, including vitals, allergies, social history and prior medical history.     REVIEW OF SYSTEMS: All systems reviewed and not pertinent unless noted.  Review of Systems   Skin:  Positive for wound.   All other systems reviewed and are negative.      Past Medical History:   Diagnosis Date    Encounter for insertion of Mirena IUD 08/28/2021       Allergies:    Sulfa antibiotics      History reviewed. No pertinent surgical history.      Social History     Socioeconomic History    Marital status: Single   Tobacco Use    Smoking status: Every Day     Current packs/day: 0.50     Types: Cigarettes     Passive exposure: Current    Smokeless tobacco: Never   Vaping Use    Vaping status: Never Used   Substance and Sexual Activity    Alcohol use: Not Currently    Drug use: Not Currently     Types: Marijuana     Comment: occasionally    Sexual activity: Defer         History reviewed. No pertinent family history.    Objective  Physical Exam:  /78 (BP Location: Left arm)   Pulse 85   Temp 98 °F (36.7 °C) (Oral)   Resp 18   Ht 162.6 cm (64\")   Wt 52.2 kg (115 lb)   SpO2 98%   BMI 19.74 kg/m²      Physical Exam  Vitals and nursing note reviewed.   Constitutional:       Appearance: Normal appearance. She is normal weight.   HENT:      Head: Normocephalic and atraumatic.      Nose: Nose normal.      Mouth/Throat:      Mouth: Mucous membranes are moist.      Pharynx: Oropharynx is clear.   Eyes:      Extraocular Movements: Extraocular movements intact.      Conjunctiva/sclera: " Spoke with patient, appointment rescheduled to 7/29/2024 at 1320.   Conjunctivae normal.      Pupils: Pupils are equal, round, and reactive to light.   Cardiovascular:      Rate and Rhythm: Normal rate.   Pulmonary:      Effort: Pulmonary effort is normal.   Abdominal:      General: Abdomen is flat.   Musculoskeletal:         General: Normal range of motion.      Cervical back: Normal range of motion and neck supple.   Skin:     General: Skin is warm and dry.      Capillary Refill: Capillary refill takes less than 2 seconds.      Comments: There is a small lump noted to waistline.  It is consistent with cyst.  There is mild amount of erythema.  Does not appear to be confluent at this time.  No indication for I&D   Neurological:      General: No focal deficit present.      Mental Status: She is alert and oriented to person, place, and time. Mental status is at baseline.   Psychiatric:         Mood and Affect: Mood normal.         Behavior: Behavior normal.         Thought Content: Thought content normal.         Judgment: Judgment normal.         Procedures    ED Course:         Lab Results (last 24 hours)       ** No results found for the last 24 hours. **             No radiology results from the last 24 hrs       MDM      Initial impression of presenting illness: Patient is a 29-year-old female without terming health history.  Presents to the ER today for lump to waistline.  Patient reports that she has had hard lump to waistline for approximately 2 years.  Reports over the last couple days this area has become red and inflamed.  She denies drainage.  Denies OTC medication or home remedy.  Denies alleviating or exacerbating factors    DDX: includes but is not limited to: Cyst, abscess, cellulitis or other    Patient arrives stable with vitals interpreted by myself.     Pertinent features from physical exam: There is a small lump noted to the waistline.  It is with erythema.  No drainage noted.  This lump is not confluent.  No indication for I&D at this time.    Initial diagnostic  plan: N/A    Results from initial plan were reviewed and interpreted by me revealing N/A    Diagnostic information from other sources: Chart review    Interventions / Re-evaluation: Vital signs stable throughout encounter    Results/clinical rationale were discussed with patient    Consultations/Discussion of results with other physicians: N/A    Disposition plan: Patient is hemodynamically stable nontoxic-appearing appropriate discharge.  Outpatient follow-up with PCP within the week.  Follow-up ER for new or symptoms.  Will send home on cephalexin.  -----        Final diagnoses:   Inflamed sebaceous cyst          Oleg Molina, APRN  06/25/25 0947

## 2025-07-16 ENCOUNTER — OFFICE VISIT (OUTPATIENT)
Dept: PSYCHIATRY | Facility: CLINIC | Age: 30
End: 2025-07-16
Payer: COMMERCIAL

## 2025-07-16 VITALS
BODY MASS INDEX: 19.29 KG/M2 | OXYGEN SATURATION: 98 % | HEART RATE: 76 BPM | HEIGHT: 64 IN | DIASTOLIC BLOOD PRESSURE: 64 MMHG | SYSTOLIC BLOOD PRESSURE: 102 MMHG | WEIGHT: 113 LBS

## 2025-07-16 DIAGNOSIS — F41.1 GAD (GENERALIZED ANXIETY DISORDER): Primary | ICD-10-CM

## 2025-07-16 DIAGNOSIS — F40.10 SOCIAL ANXIETY DISORDER: ICD-10-CM

## 2025-07-16 DIAGNOSIS — F32.1 MODERATE MAJOR DEPRESSION: ICD-10-CM

## 2025-07-16 RX ORDER — HYDROXYZINE HYDROCHLORIDE 10 MG/1
10-20 TABLET, FILM COATED ORAL 3 TIMES DAILY PRN
Qty: 120 TABLET | Refills: 1 | Status: SHIPPED | OUTPATIENT
Start: 2025-07-16

## 2025-07-16 NOTE — PROGRESS NOTES
Subjective   Aleyda Brown is a 29 y.o. female who presents today for initial evaluation     Chief Complaint:  anxiety and depression    History of Present Illness:   History of Present Illness  Aleyda Brown presents to [facility] for initial evaluation. Most recently saw another provider within clinic. Not currently taking any medications. Reports history of anxiety and depression with a few past medication trials.     Reports having more social anxiety with biggest trigger of anxiety being work. Becomes anxious when out in public or places that she will interact with others including the gym, stores or restaurants. Says that she does have anxiety at home, but symptoms are much less severe. Anxiety is the worst when waking and prior to going in for work. Symptoms of anxiety include feeling nervous, anxious, on edge, worry, trouble relaxing, restlessness, easily annoyed/irritable and feeling afraid something awful might happen. Says that she often becomes easily angered and feels that all her emotions turning to anger. Feels that worry is excessive and often over analyzes every situation while at work. 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 Currently works third shift from 10:30 PM to 7:30 AM. Says that she really enjoys her position, but the interactions with others are anxiety provoking. Says that she is in constant fear of having increased anxiety that will cause her to lose her job. Voices that she does not discuss struggles at work and had to leave work about 4 months ago after having elevated anxiety levels and got a call from HR to inquire about the situation due to only having a certain amount of points. Says that she has used all of her vacation time.    Has episodes of depression with symptoms that include sadness, tends to self isolate during these times. Prior to current  "relationship, feels that depressive episodes were more frequent, would become really depressed and sleep for long periods of time. Voices that last depressive episode was about a couple of months ago and resolved after a few days without any type of intervention. During depressive episodes, mood is down, is overall more negative with no motivation and little interest/pleasure in doing things.  Has been in current relationship for 3.5 years and was  last month. She currently works at [employer] in [place] with plans to transfer to the [employer] within a couple of months. Voices that significant other is supportive. Currently lives with her mother in the basement for past 1.5 years.    Says that sleep is \"decent\" and feels that sleep sometimes a \"get away.\" Has times when she can sleep all day after work without waking while other times she will wake every couple of hours. Unable to quantify the number of hours she sleeps total. Appetite can sometimes vary, typically snacks then eats at least \"one good meal\" per day. Denies any current SI/HI or AVH. Voices sometimes having intermittent thoughts of not wanting to do this anymore. Adamantly denies having plan or intent. PHQ-9 total score: 13, ROWENA-7 total score: 17    Currently smokes cigarettes (1/2 PPD) and uses THC (flower) daily, uses approximately half gram per day on average with some days being less.     Denies any past psychiatric hospitalizations. Denies any history of suicide attempts, self-harming behaviors, SI/HI or AVH.    Previous psychiatric medications: Prozac (most consistent), clonidine, propranolol, Lamictal (unsure of effectiveness), Seroquel (in 2023), Pristiq (did not have adequate trial)     The following portions of the patient's history were reviewed and updated as appropriate: allergies, current medications, past family history, past medical history, past social history, past surgical history and problem list.    Past Medical " History:   Diagnosis Date    Encounter for insertion of Mirena IUD 08/28/2021     Social History     Socioeconomic History    Marital status: Single   Tobacco Use    Smoking status: Every Day     Current packs/day: 0.50     Types: Cigarettes     Passive exposure: Current    Smokeless tobacco: Never   Vaping Use    Vaping status: Never Used   Substance and Sexual Activity    Alcohol use: Not Currently    Drug use: Not Currently     Types: Marijuana     Comment: occasionally    Sexual activity: Defer     Family History:  History reviewed. No pertinent family history.    Past Surgical History:  History reviewed. No pertinent surgical history.    Problem List:  There is no problem list on file for this patient.    Allergies   Allergen Reactions    Sulfa Antibiotics Rash      Current Outpatient Medications   Medication Sig Dispense Refill    benzoyl peroxide 10 % external wash       levonorgestrel (Mirena, 52 MG,) 20 MCG/24HR IUD 1 each by Intrauterine route 1 (One) Time.      multivitamin with minerals tablet tablet Take 1 tablet by mouth Daily.      FLUoxetine (PROzac) 20 MG capsule Take 1 capsule by mouth Daily. 30 capsule 2    hydrOXYzine (ATARAX) 10 MG tablet Take 1-2 tablets by mouth 3 (Three) Times a Day As Needed for Anxiety (anxiety and/or sleep). 120 tablet 1     No current facility-administered medications for this visit.     Review of Systems   Constitutional:  Negative for activity change, appetite change, unexpected weight gain and unexpected weight loss.   Respiratory:  Negative for shortness of breath.    Cardiovascular:  Negative for chest pain.   Psychiatric/Behavioral:  Positive for decreased concentration, dysphoric mood and sleep disturbance. Negative for suicidal ideas. The patient is nervous/anxious.      Physical Exam  Vitals reviewed.   Constitutional:       General: She is not in acute distress.     Appearance: Normal appearance. She is normal weight.   Neurological:      Mental Status: She is  "alert.      Gait: Gait normal.     Vitals:   Blood pressure 102/64, pulse 76, height 162.6 cm (64\"), weight 51.3 kg (113 lb), SpO2 98%.    Mental Status Exam:   Hygiene:   good  Cooperation:  Cooperative  Eye Contact:  Good  Psychomotor Behavior:  Appropriate  Affect:  Full range and Appropriate  Mood: normal  Hopelessness: Denies  Speech:  Normal  Thought Process:  Goal directed and Linear  Thought Content:  Mood congruent  Suicidal:  None  Homicidal:  None  Hallucinations:  None  Delusion:  None  Memory:  Intact  Orientation:  Person, Place, Time, and Situation  Reliability:  good  Insight:  Good  Judgement:  Good  Impulse Control:  Good    Assessment & Plan   Problems Addressed this Visit    None  Visit Diagnoses         ROWENA (generalized anxiety disorder)    -  Primary    Relevant Medications    FLUoxetine (PROzac) 20 MG capsule    hydrOXYzine (ATARAX) 10 MG tablet      Social anxiety disorder        Relevant Medications    FLUoxetine (PROzac) 20 MG capsule    hydrOXYzine (ATARAX) 10 MG tablet      Moderate major depression        Relevant Medications    FLUoxetine (PROzac) 20 MG capsule    hydrOXYzine (ATARAX) 10 MG tablet          Diagnoses         Codes Comments      ROWENA (generalized anxiety disorder)    -  Primary ICD-10-CM: F41.1  ICD-9-CM: 300.02       Social anxiety disorder     ICD-10-CM: F40.10  ICD-9-CM: 300.23       Moderate major depression     ICD-10-CM: F32.1  ICD-9-CM: 296.22           Visit Diagnoses:    ICD-10-CM ICD-9-CM   1. ROWENA (generalized anxiety disorder)  F41.1 300.02   2. Social anxiety disorder  F40.10 300.23   3. Moderate major depression  F32.1 296.22     Aleyda presents for medication management. She has experienced elevated levels of anxiety that is worsened in social settings. Also having episodes of depression. Not currently taking any medications, but voices interest in medication to better manage bothersome symptoms. Previously tried Prozac that she felt was most beneficial. " Discussed medication options, will start Prozac 20 mg daily for anxiety and depression along with hydroxyzine 10 mg 3 times daily as needed.  We discussed risks versus benefits, as well as potential adverse effects associated with adding this medication to regimen. She is in agreement with this plan and was educated on the importance of compliance with all aspects of treatment and follow-up appointments. Patient is agreeable to call the office with any worsening of symptoms or onset of side effects.  -Encouraged to consider scheduling appointment with counseling  -Start Prozac 20 mg daily  -Start hydroxyzine 10 mg 3 times daily as needed for anxiety  -Reviewed previous available documentation and most recent available labs.   ANTOINETTE reviewed and is appropriate.     GOALS:  Short Term Goals: Patient will be compliant with medication, and patient will have no significant medication related side effects.  Patient will be engaged in psychotherapy as indicated.  Patient will report subjective improvement of symptoms.  Long term goals: To stabilize mood and treat/improve subjective symptoms, the patient will stay out of the hospital, the patient will be at an optimal level of functioning, and the patient will take all medications as prescribed.  The patient/guardian verbalized understanding and agreement with goals that were mutually set.    TREATMENT PLAN: Continue supportive psychotherapy efforts and medications as indicated for patient's diagnosis.  Pharmacological and Non-Pharmacological treatment options discussed during today's visit. Patient/Guardian acknowledged and verbally consented with current treatment plan and was educated on the importance of compliance with treatment and follow-up appointments.      MEDICATION ISSUES:  Discussed medication options and treatment plan of prescribed medication as well as the risks, benefits, any black box warnings, and side effects including potential falls, possible impaired  driving, and metabolic adversities among others. Patient is agreeable to call the office with any worsening of symptoms or onset of side effects, or if any concerns or questions arise.  The contact information for the office is made available to the patient. Patient is agreeable to call 911 or go to the nearest ER should they begin having any SI/HI, or if any urgent concerns arise. No medication side effects or related complaints today.     MEDS ORDERED DURING VISIT:  New Medications Ordered This Visit   Medications    FLUoxetine (PROzac) 20 MG capsule     Sig: Take 1 capsule by mouth Daily.     Dispense:  30 capsule     Refill:  2    hydrOXYzine (ATARAX) 10 MG tablet     Sig: Take 1-2 tablets by mouth 3 (Three) Times a Day As Needed for Anxiety (anxiety and/or sleep).     Dispense:  120 tablet     Refill:  1     FOLLOW UP:  Return in about 4 weeks (around 8/13/2025) for Recheck.             This document has been electronically signed by SONIDO Tavarez  August 3, 2025 23:20 EDT    Please note that portions of this note were completed with a voice recognition program. Efforts were made to edit dictation, but occasionally words are mistranscribed.